# Patient Record
Sex: FEMALE | Race: WHITE | Employment: UNEMPLOYED | URBAN - METROPOLITAN AREA
[De-identification: names, ages, dates, MRNs, and addresses within clinical notes are randomized per-mention and may not be internally consistent; named-entity substitution may affect disease eponyms.]

---

## 2017-04-24 PROBLEM — S46.812A TRAPEZIUS STRAIN, LEFT, INITIAL ENCOUNTER: Status: ACTIVE | Noted: 2017-04-24

## 2017-04-24 PROBLEM — G25.89 SCAPULAR DYSKINESIS: Status: ACTIVE | Noted: 2017-04-24

## 2017-06-26 PROCEDURE — 88175 CYTOPATH C/V AUTO FLUID REDO: CPT | Performed by: OBSTETRICS & GYNECOLOGY

## 2017-06-26 PROCEDURE — 87624 HPV HI-RISK TYP POOLED RSLT: CPT | Performed by: OBSTETRICS & GYNECOLOGY

## 2017-07-20 ENCOUNTER — OFFICE VISIT (OUTPATIENT)
Dept: FAMILY MEDICINE CLINIC | Facility: CLINIC | Age: 47
End: 2017-07-20

## 2017-07-20 VITALS
TEMPERATURE: 98 F | SYSTOLIC BLOOD PRESSURE: 100 MMHG | WEIGHT: 127 LBS | DIASTOLIC BLOOD PRESSURE: 60 MMHG | HEART RATE: 93 BPM | HEIGHT: 66.25 IN | RESPIRATION RATE: 12 BRPM | BODY MASS INDEX: 20.41 KG/M2

## 2017-07-20 DIAGNOSIS — B07.0 PLANTAR WART: Primary | ICD-10-CM

## 2017-07-20 PROCEDURE — 99213 OFFICE O/P EST LOW 20 MIN: CPT | Performed by: FAMILY MEDICINE

## 2017-07-25 NOTE — PROGRESS NOTES
HPI:    Patient ID: Janett Baird is a 56year old female. HPI  Patient is here for evaluation of wart on the bottom of her foot. She would like it surgically removed.   Review of Systems  Negative except for the above       Current Outpatient Pre

## 2017-09-11 ENCOUNTER — OFFICE VISIT (OUTPATIENT)
Dept: FAMILY MEDICINE CLINIC | Facility: CLINIC | Age: 47
End: 2017-09-11

## 2017-09-11 VITALS
BODY MASS INDEX: 21.07 KG/M2 | SYSTOLIC BLOOD PRESSURE: 110 MMHG | DIASTOLIC BLOOD PRESSURE: 62 MMHG | TEMPERATURE: 98 F | HEIGHT: 65.5 IN | HEART RATE: 80 BPM | WEIGHT: 128 LBS | RESPIRATION RATE: 16 BRPM

## 2017-09-11 DIAGNOSIS — Z01.89 ROUTINE LAB DRAW: ICD-10-CM

## 2017-09-11 DIAGNOSIS — R74.8 ELEVATED LIVER ENZYMES: ICD-10-CM

## 2017-09-11 DIAGNOSIS — R73.09 ABNORMAL GLUCOSE: ICD-10-CM

## 2017-09-11 DIAGNOSIS — Z12.31 VISIT FOR SCREENING MAMMOGRAM: ICD-10-CM

## 2017-09-11 DIAGNOSIS — Z00.00 ROUTINE ADULT HEALTH MAINTENANCE: Primary | ICD-10-CM

## 2017-09-11 PROCEDURE — 99396 PREV VISIT EST AGE 40-64: CPT | Performed by: FAMILY MEDICINE

## 2017-09-11 NOTE — PROGRESS NOTES
HPI:   Andres Luna is a 56year old female who presents for a complete physical exam. Symptoms: denies discharge, itching, burning or dysuria. Patient has no complaints today.     Wt Readings from Last 6 Encounters:  09/11/17 : 128 lb  07/20/17 : 12 Collected:           06/26/2017 10:05 AM          Ordering Location:     [facility] [place] [facility] - 120          Received:            06/26/2017 09:09 PM                                WALT BAKER excessive sweating. LYMPHATICS: denies swollen glands      EXAM:   /62   Pulse 80   Temp 97.9 °F (36.6 °C) (Oral)   Resp 16   Ht 65.5\"   Wt 128 lb   BMI 20.98 kg/m²   Body mass index is 20.98 kg/m².    GENERAL: well developed, well nourished and in recommendations and agrees to the plan. The patient is asked to return for complete physical yearly.

## 2017-09-12 ENCOUNTER — HOSPITAL ENCOUNTER (OUTPATIENT)
Dept: MAMMOGRAPHY | Age: 47
Discharge: HOME | End: 2017-09-12
Attending: FAMILY MEDICINE
Payer: COMMERCIAL

## 2017-09-12 DIAGNOSIS — Z12.31 VISIT FOR SCREENING MAMMOGRAM: ICD-10-CM

## 2017-09-12 PROCEDURE — 77067 SCR MAMMO BI INCL CAD: CPT | Performed by: FAMILY MEDICINE

## 2017-09-22 PROBLEM — M54.12 CERVICAL RADICULAR PAIN: Status: ACTIVE | Noted: 2017-09-22

## 2017-09-29 ENCOUNTER — HOSPITAL ENCOUNTER (OUTPATIENT)
Dept: ULTRASOUND IMAGING | Age: 47
Discharge: HOME | End: 2017-09-29
Attending: FAMILY MEDICINE
Payer: COMMERCIAL

## 2017-09-29 ENCOUNTER — HOSPITAL ENCOUNTER (OUTPATIENT)
Dept: MAMMOGRAPHY | Age: 47
Discharge: HOME | End: 2017-09-29
Attending: FAMILY MEDICINE
Payer: COMMERCIAL

## 2017-09-29 DIAGNOSIS — R92.8 ABNORMAL MAMMOGRAM OF BOTH BREASTS: ICD-10-CM

## 2017-09-29 PROCEDURE — 77066 DX MAMMO INCL CAD BI: CPT | Performed by: FAMILY MEDICINE

## 2017-09-29 PROCEDURE — 76641 ULTRASOUND BREAST COMPLETE: CPT | Performed by: FAMILY MEDICINE

## 2017-09-29 PROCEDURE — 77062 BREAST TOMOSYNTHESIS BI: CPT | Performed by: FAMILY MEDICINE

## 2017-10-02 PROBLEM — M48.02 NEURAL FORAMINAL STENOSIS OF CERVICAL SPINE: Status: ACTIVE | Noted: 2017-10-02

## 2017-10-03 ENCOUNTER — TELEPHONE (OUTPATIENT)
Dept: FAMILY MEDICINE CLINIC | Facility: CLINIC | Age: 47
End: 2017-10-03

## 2017-10-03 NOTE — TELEPHONE ENCOUNTER
Pt would like to know the name of a derm that is aggressive with warts.  She stated Dr. Bertin Han gave her the name of one but she cannot remember and it is not in her chart

## 2017-10-05 PROBLEM — M50.30 DDD (DEGENERATIVE DISC DISEASE), CERVICAL: Status: ACTIVE | Noted: 2017-10-05

## 2017-10-05 PROBLEM — M47.812 OSTEOARTHRITIS OF CERVICAL SPINE, UNSPECIFIED SPINAL OSTEOARTHRITIS COMPLICATION STATUS: Status: ACTIVE | Noted: 2017-10-05

## 2017-11-29 PROBLEM — M47.812 FACET ARTHROPATHY, CERVICAL: Status: ACTIVE | Noted: 2017-11-29

## 2017-12-07 ENCOUNTER — OFFICE VISIT (OUTPATIENT)
Dept: FAMILY MEDICINE CLINIC | Facility: CLINIC | Age: 47
End: 2017-12-07

## 2017-12-07 VITALS
SYSTOLIC BLOOD PRESSURE: 140 MMHG | TEMPERATURE: 98 F | RESPIRATION RATE: 12 BRPM | DIASTOLIC BLOOD PRESSURE: 80 MMHG | BODY MASS INDEX: 22.22 KG/M2 | HEART RATE: 95 BPM | WEIGHT: 135 LBS | OXYGEN SATURATION: 100 % | HEIGHT: 65.5 IN

## 2017-12-07 DIAGNOSIS — R74.8 ELEVATED LIVER ENZYMES: ICD-10-CM

## 2017-12-07 DIAGNOSIS — F32.A DEPRESSION, UNSPECIFIED DEPRESSION TYPE: Primary | ICD-10-CM

## 2017-12-07 PROCEDURE — 99214 OFFICE O/P EST MOD 30 MIN: CPT | Performed by: FAMILY MEDICINE

## 2017-12-07 NOTE — PROGRESS NOTES
HPI:    Patient ID: Abiola Faria is a 56year old female. HPI  Patient is here with complaint of having depression over the last several months or more.   She states that her  is controlling and this has caused her a great deal of grief and like her to be seen by [facility] psychiatry department. Will make referral to [facility]. I also recommend patient have lab work done an abdominal ultrasound which she has not done from August 2016. Importance of doing this discussed.   Patient was rafat

## 2018-05-21 ENCOUNTER — OFFICE VISIT (OUTPATIENT)
Dept: FAMILY MEDICINE CLINIC | Facility: CLINIC | Age: 48
End: 2018-05-21

## 2018-05-21 VITALS
TEMPERATURE: 99 F | DIASTOLIC BLOOD PRESSURE: 78 MMHG | RESPIRATION RATE: 12 BRPM | OXYGEN SATURATION: 100 % | SYSTOLIC BLOOD PRESSURE: 130 MMHG | WEIGHT: 130 LBS | BODY MASS INDEX: 21.4 KG/M2 | HEIGHT: 65.5 IN | HEART RATE: 97 BPM

## 2018-05-21 DIAGNOSIS — L98.9 SKIN LESION: Primary | ICD-10-CM

## 2018-05-21 DIAGNOSIS — R74.8 ELEVATED LIVER ENZYMES: ICD-10-CM

## 2018-05-21 PROCEDURE — 99213 OFFICE O/P EST LOW 20 MIN: CPT | Performed by: FAMILY MEDICINE

## 2018-05-21 RX ORDER — CLOBETASOL PROPIONATE 0.5 MG/G
CREAM TOPICAL
Qty: 30 G | Refills: 0 | Status: ON HOLD | OUTPATIENT
Start: 2018-05-21 | End: 2018-05-26

## 2018-05-21 RX ORDER — CLINDAMYCIN HYDROCHLORIDE 300 MG/1
300 CAPSULE ORAL 2 TIMES DAILY
Qty: 20 CAPSULE | Refills: 0 | Status: ON HOLD | OUTPATIENT
Start: 2018-05-21 | End: 2018-05-26

## 2018-05-21 NOTE — PROGRESS NOTES
HPI:    Patient ID: Adonis Morrell is a 56year old female. HPI  Patient is here with complaint of waking up this morning with having possible spider bite on the right hand on the top of her middle finger. She has a red swollen area there.   No shnaiqua follow-up in 1 week. If worsens seek medical attention through the ER. Patient told of importance of getting fasting blood work done as she does have history of elevated liver enzymes as well.   Patient told need to have checked done for that with blood w

## 2018-05-22 ENCOUNTER — TELEPHONE (OUTPATIENT)
Dept: FAMILY MEDICINE CLINIC | Facility: CLINIC | Age: 48
End: 2018-05-22

## 2018-05-22 ENCOUNTER — HOSPITAL ENCOUNTER (INPATIENT)
Facility: HOSPITAL | Age: 48
LOS: 3 days | Discharge: HOME | DRG: 603 | End: 2018-05-26
Attending: EMERGENCY MEDICINE | Admitting: HOSPITALIST
Payer: COMMERCIAL

## 2018-05-22 DIAGNOSIS — L03.113 CELLULITIS OF RIGHT HAND: Primary | ICD-10-CM

## 2018-05-22 PROCEDURE — 02HV33Z INSERTION OF INFUSION DEVICE INTO SUPERIOR VENA CAVA, PERCUTANEOUS APPROACH: ICD-10-PCS | Performed by: HOSPITALIST

## 2018-05-22 PROCEDURE — 99220 INITIAL OBSERVATION CARE,LEVL III: CPT | Performed by: HOSPITALIST

## 2018-05-22 RX ORDER — POTASSIUM CHLORIDE 20 MEQ/1
40 TABLET, EXTENDED RELEASE ORAL EVERY 4 HOURS
Status: COMPLETED | OUTPATIENT
Start: 2018-05-23 | End: 2018-05-23

## 2018-05-22 RX ORDER — HYDROMORPHONE HYDROCHLORIDE 1 MG/ML
0.5 INJECTION, SOLUTION INTRAMUSCULAR; INTRAVENOUS; SUBCUTANEOUS ONCE
Status: COMPLETED | OUTPATIENT
Start: 2018-05-22 | End: 2018-05-22

## 2018-05-22 RX ORDER — HYDROMORPHONE HYDROCHLORIDE 1 MG/ML
0.8 INJECTION, SOLUTION INTRAMUSCULAR; INTRAVENOUS; SUBCUTANEOUS EVERY 2 HOUR PRN
Status: DISCONTINUED | OUTPATIENT
Start: 2018-05-22 | End: 2018-05-26

## 2018-05-22 RX ORDER — HYDROCODONE BITARTRATE AND ACETAMINOPHEN 5; 325 MG/1; MG/1
1 TABLET ORAL EVERY 4 HOURS PRN
Status: DISCONTINUED | OUTPATIENT
Start: 2018-05-22 | End: 2018-05-26

## 2018-05-22 RX ORDER — ONDANSETRON 2 MG/ML
4 INJECTION INTRAMUSCULAR; INTRAVENOUS EVERY 6 HOURS PRN
Status: DISCONTINUED | OUTPATIENT
Start: 2018-05-22 | End: 2018-05-26

## 2018-05-22 RX ORDER — ONDANSETRON 2 MG/ML
4 INJECTION INTRAMUSCULAR; INTRAVENOUS ONCE
Status: COMPLETED | OUTPATIENT
Start: 2018-05-22 | End: 2018-05-22

## 2018-05-22 RX ORDER — METOCLOPRAMIDE HYDROCHLORIDE 5 MG/ML
10 INJECTION INTRAMUSCULAR; INTRAVENOUS EVERY 8 HOURS PRN
Status: DISCONTINUED | OUTPATIENT
Start: 2018-05-22 | End: 2018-05-26

## 2018-05-22 RX ORDER — CLINDAMYCIN PHOSPHATE 600 MG/50ML
600 INJECTION INTRAVENOUS ONCE
Status: COMPLETED | OUTPATIENT
Start: 2018-05-22 | End: 2018-05-22

## 2018-05-22 RX ORDER — HYDROMORPHONE HYDROCHLORIDE 1 MG/ML
0.2 INJECTION, SOLUTION INTRAMUSCULAR; INTRAVENOUS; SUBCUTANEOUS EVERY 2 HOUR PRN
Status: DISCONTINUED | OUTPATIENT
Start: 2018-05-22 | End: 2018-05-26

## 2018-05-22 RX ORDER — CLINDAMYCIN PHOSPHATE 600 MG/50ML
600 INJECTION INTRAVENOUS EVERY 8 HOURS
Status: DISCONTINUED | OUTPATIENT
Start: 2018-05-23 | End: 2018-05-23

## 2018-05-22 RX ORDER — SODIUM CHLORIDE 9 MG/ML
INJECTION, SOLUTION INTRAVENOUS CONTINUOUS
Status: DISCONTINUED | OUTPATIENT
Start: 2018-05-22 | End: 2018-05-25

## 2018-05-22 RX ORDER — KETOROLAC TROMETHAMINE 30 MG/ML
30 INJECTION, SOLUTION INTRAMUSCULAR; INTRAVENOUS ONCE
Status: COMPLETED | OUTPATIENT
Start: 2018-05-22 | End: 2018-05-22

## 2018-05-22 RX ORDER — HYDROMORPHONE HYDROCHLORIDE 1 MG/ML
0.4 INJECTION, SOLUTION INTRAMUSCULAR; INTRAVENOUS; SUBCUTANEOUS EVERY 2 HOUR PRN
Status: DISCONTINUED | OUTPATIENT
Start: 2018-05-22 | End: 2018-05-26

## 2018-05-22 RX ORDER — ACETAMINOPHEN 325 MG/1
650 TABLET ORAL EVERY 4 HOURS PRN
Status: DISCONTINUED | OUTPATIENT
Start: 2018-05-22 | End: 2018-05-26

## 2018-05-22 RX ORDER — HYDROCODONE BITARTRATE AND ACETAMINOPHEN 5; 325 MG/1; MG/1
2 TABLET ORAL EVERY 4 HOURS PRN
Status: DISCONTINUED | OUTPATIENT
Start: 2018-05-22 | End: 2018-05-26

## 2018-05-22 NOTE — TELEPHONE ENCOUNTER
See recommendations below. Spoke with patient regarding Md recommendations, patient verbalized understanding and states that she will go to the ER and will contact us with an update.

## 2018-05-22 NOTE — TELEPHONE ENCOUNTER
I would strongly recommend getting it checked out through the ER or urgent care. She may need further testing done.

## 2018-05-22 NOTE — TELEPHONE ENCOUNTER
Patient was seen yesterday for a spider bite. Patient would like to know if there is anything she can take for the swelling and for the pain. She states that her fingers are still very swollen and she is unable to bend them.  The pain is now going up to her

## 2018-05-22 NOTE — TELEPHONE ENCOUNTER
Patient states that right 3 middle fingers and hand are worse than yesterday, pt states that she cant move fingers or make a fist. States that through out the night pain radiates up her arm up to her shoulder. Numb feeling.  PT has started antibiotic and Cl

## 2018-05-22 NOTE — TELEPHONE ENCOUNTER
Attempted to reach pt for a condition update as Dr Prisca Wiseman would like for her to go to the ER to make sure she does not have cellulitis that would require IV abx.  No answer, left message to call back

## 2018-05-23 ENCOUNTER — APPOINTMENT (OUTPATIENT)
Dept: GENERAL RADIOLOGY | Facility: HOSPITAL | Age: 48
DRG: 603 | End: 2018-05-23
Attending: HOSPITALIST
Payer: COMMERCIAL

## 2018-05-23 PROCEDURE — 99232 SBSQ HOSP IP/OBS MODERATE 35: CPT | Performed by: HOSPITALIST

## 2018-05-23 PROCEDURE — 0H9FXZZ DRAINAGE OF RIGHT HAND SKIN, EXTERNAL APPROACH: ICD-10-PCS | Performed by: ORTHOPAEDIC SURGERY

## 2018-05-23 PROCEDURE — 73130 X-RAY EXAM OF HAND: CPT | Performed by: HOSPITALIST

## 2018-05-23 RX ORDER — LORAZEPAM 2 MG/ML
0.5 INJECTION INTRAMUSCULAR ONCE
Status: COMPLETED | OUTPATIENT
Start: 2018-05-23 | End: 2018-05-23

## 2018-05-23 RX ORDER — LORAZEPAM 2 MG/ML
INJECTION INTRAMUSCULAR
Status: COMPLETED
Start: 2018-05-23 | End: 2018-05-23

## 2018-05-23 RX ORDER — DIPHENHYDRAMINE HCL 25 MG
25 CAPSULE ORAL EVERY 6 HOURS PRN
Status: DISCONTINUED | OUTPATIENT
Start: 2018-05-23 | End: 2018-05-26

## 2018-05-23 NOTE — CONSULTS
INFECTIOUS DISEASE CONSULT NOTE    Chai Barnett Patient Status:  Inpatient    1970 MRN [id number]   [facility] 4NW-A Attending Dread Contreras MD   Hosp Day # 0 PCP Mita Thomason per tab 2 tablet, 2 tablet, Oral, Q4H PRN  •  HYDROmorphone HCl (DILAUDID) 1 MG/ML injection 0.2 mg, 0.2 mg, Intravenous, Q2H PRN **OR** HYDROmorphone HCl (DILAUDID) 1 MG/ML injection 0.4 mg, 0.4 mg, Intravenous, Q2H PRN **OR** HYDROmorphone HCl (DILAUDID) --    TP  7.8   --      Microbiology    Reviewed in EMR,   [address]    Radiology: No results found. ASSESSMENT/ PLAN:    1. Skin/ soft tissue infection R hand- possible MRSA infection.  Pt with hand swelling and difficulty moving fingers, need to r/o d

## 2018-05-23 NOTE — ED INITIAL ASSESSMENT (HPI)
Pt states since Monday AM she is having redness to the right 3rd finger that she noticed when she woke up. Today the swelling increased and there is redness moving up her arm. She was started on Clindamycin by her PCP yesterday.

## 2018-05-23 NOTE — PAYOR COMM NOTE
--------------  ADMISSION REVIEW     Payor: [address]  Subscriber #:  [id number]  Authorization Number: [id number]/A    Admit date: 5/23/18  Admit time: 200       Admitting Physician: Leonila Estevez MD  Attending Physician:  Dolores Jordan MD  Primary LIGATION    Review of Systems  Positive for stated complaint: Bite right hand/ redness/swelling   Other systems are as noted in HPI. Constitutional and vital signs reviewed. All other systems reviewed and negative except as noted above.     Physical Exa Neutrophil Absolute Prelim 7.54 (*)     Neutrophil Absolute 7.54 (*)     Lymphocyte Absolute 0.75 (*)     All other components within normal limits   CBC WITH DIFFERENTIAL WITH PLATELET   BLOOD CULTURE   BLOOD CULTURE     ED Course as of May 22 2059  MDM her right third finger. The redness, swelling and pain progressively worsened as the day progressed. The swelling and redness involved the dorsum of the hand and she noticed red streaks going up her arm.   She also started to c/o pain in her forearm and s murmurs, rubs or gallops. Equal pulses. Chest and Back: No tenderness or deformity. Abdomen: Soft, nontender, nondistended. Positive bowel sounds. No rebound, guarding or organomegaly. Neurologic: No focal neurological deficits.  CNII-XII grossly intac HYDROcodone-acetaminophen (NORCO) 5-325 MG per tab 1 tablet     Date Action Dose Route User    5/23/2018 1120 Given 1 tablet Oral Kylah Maria [facility]    5/23/2018 0010 Given 1 tablet Oral Kevin Handy, RN      HYDROmorphone HCl (DILAUDID) 1 MG/

## 2018-05-23 NOTE — H&P
[facility]PITAL[facility]  History and Physical     Hodan Carroll Patient Status:  Observation    1970 MRN [id number]   [facility] 4NW-A Attending Daniel Monaco MD   Hosp Day # 0 PCP Gerhardt Guile, MD     Chief Complaint: Celluitis MG Oral Cap Take 1 capsule (300 mg total) by mouth 2 (two) times daily. Disp: 20 capsule Rfl: 0   Clobetasol Propionate 0.05 % External Cream Apply a thin layer twice a day upto 2 weeks.  Disp: 30 g Rfl: 0   Betamethasone Dipropionate 0.05 % External Ointme 05/22/18 2013   GLU  94   BUN  4*   CREATSERUM  0.68   GFRAA  121   GFRNAA  105   CA  9.2   ALB  4.1   NA  139   K  3.4*   CL  104   CO2  27.0   ALKPHO  62   AST  36   ALT  56*   BILT  0.5   TP  7.8       Estimated Creatinine Clearance: 96.3 mL/min (base

## 2018-05-23 NOTE — PROGRESS NOTES
NURSING ADMISSION NOTE      Patient admitted via  Wheelchair, accompanied by family member via   Family car from [place] ER, right hand is swollen and tender, some  Redness, initial dose of IV ABT given in PER, moderate pain.   Right hand elevated, O

## 2018-05-23 NOTE — ED PROVIDER NOTES
Patient Seen in: THE [facility] Emergency Department In [place]    History   Patient presents with:  Bite Sting,Insect (integumentary)  Cellulitis (integumentary, infectious)    Stated Complaint: Bite right hand/ redness/swelling     HPI    Patient is a 47-ye 20   Ht 167.6 cm (5' 6\")   Wt 59 kg   SpO2 99%   BMI 20.98 kg/m²         Physical Exam   Constitutional: She is oriented to person, place, and time. She appears well-developed and well-nourished. HENT:   Head: Normocephalic and atraumatic.    Mouth/Throa DIFFERENTIAL[[id number]]          Abnormal            Final result                 Please view results for these tests on the individual orders.    BLOOD CULTURE   BLOOD CULTURE       ED Course as of May 22 2059  ---------------------------------------------

## 2018-05-23 NOTE — PLAN OF CARE
Minimal or absence of nausea and vomiting Not Progressing      Skin integrity remains intact Not Progressing      Absence of urinary retention Progressing      Electrolytes maintained within normal limits Progressing    Afebrile, still with erythema to rig

## 2018-05-23 NOTE — CONSULTS
[facility] dosing service    Initial Pharmacokinetic Consult for Vancomycin Dosing     Corrie Foreman is a 56year old female who is being treated for cellulitis.   Pharmacy has been asked to dose Vancomycin by Dr. Gurpreet Ridley    She is allergic to cephale assist in her care.     Leah Vazquez, PharmD  5/23/2018  12:46 PM  [address] [place] [address] Extension: [phone]

## 2018-05-23 NOTE — PROGRESS NOTES
[facility]  Progress Note     Myranda Chapa Patient Status:  Inpatient    1970 MRN [id number]   [facility] 4NW-A Attending Anabella Marcus MD   Hosp Day # 0 PCP Anita Cochran MD     Chief Complaint: cellulitis    S: Joslyn cultures  3. ID consult  2. Hypokalemia, replace  3.  Cervical radiculopathy       Plan of care: as above    Quality:  · DVT Prophylaxis: SCDS  · CODE status: full  · Shirley:no  · Central line: no    Estimated date of discharge: TBD  Discharge is dependent o

## 2018-05-24 PROCEDURE — 99232 SBSQ HOSP IP/OBS MODERATE 35: CPT | Performed by: HOSPITALIST

## 2018-05-24 NOTE — PLAN OF CARE
SKIN/TISSUE INTEGRITY - ADULT    • Skin integrity remains intact Not Progressing          GASTROINTESTINAL - ADULT    • Minimal or absence of nausea and vomiting Progressing        GENITOURINARY - ADULT    • Absence of urinary retention Progressing

## 2018-05-24 NOTE — PROGRESS NOTES
Pain decreased since yesterday. Patient states she is better able to move the fingers. Afebrile    Exam of the right upper extremity: Discoloration persists at the proximal dorsal long finger. Small open area from the I&D.   No significant ongoing shaniqua

## 2018-05-24 NOTE — PAYOR COMM NOTE
--------------  CONTINUED STAY REVIEW    Payor: [facility]  Subscriber #:  [id number]  Authorization Number: [id number]    Admit date: 5/23/18  Admit time: 200    Admitting Physician: Anabella Marcus MD  Attending Physician:  Anabella Marcus than 1 mL of purulent material was expressed from the site. Sterile gauze and dressing was applied with a surrounding ace wrap.  The procedure was performed under sterile conditions.             MEDICATIONS ADMINISTERED IN LAST 1 DAY:  diphenhydrAMINE (HOLLIE

## 2018-05-24 NOTE — PROGRESS NOTES
[facility]  TEL: [phone]  FAX: [phone]    David Razo Patient Status:  Inpatient    1970 MRN [id number]   [facility] 4NW-A Attending Antwan Francis MD   Hosp Day # 1 PCP Padma Plascencia COMPARISON:  None. INDICATIONS:  abscess? PATIENT STATED HISTORY: (As transcribed by Technologist)  Patient offered no additional history at this time.     FINDINGS:  There is soft tissue swelling within the right hand which may represent cellulitis in th

## 2018-05-24 NOTE — CONSULTS
[facility]  ORTHOPEDIC SURGERY CONSULT NOTE    Dez Keearmando Patient Status:  Inpatient    1970 MRN [id number]   [facility] 4NW-A Attending Augusta Schuster MD   Hosp Day # 0 PCP Columbus Rubinstein, MD     Reason for Consultation: Continuous  •  acetaminophen (TYLENOL) tab 650 mg, 650 mg, Oral, Q4H PRN **OR** HYDROcodone-acetaminophen (NORCO) 5-325 MG per tab 1 tablet, 1 tablet, Oral, Q4H PRN **OR** HYDROcodone-acetaminophen (NORCO) 5-325 MG per tab 2 tablet, 2 tablet, Oral, Q4H PRN right hand  - Continue antibiotics per ID  - Incision and drainage performed; aerobic cultures sent  - We will observe the patient over the next 24 hours to determine if any surgical intervention is necessary  - NPO at midnight    The risks and benefits of

## 2018-05-24 NOTE — PROGRESS NOTES
[facility]  Progress Note     Primo Mayer Patient Status:  Inpatient    1970 MRN [id number]   [facility] 4NW-A Attending Jayda Vides MD   Hosp Day # 1 PCP Binta Rodriguez MD     Chief Complaint: cellulitis    S: Joslyn and Ortho consult  2. Hypokalemia, replace  3.  Cervical radiculopathy       Plan of care: as above    Quality:  · DVT Prophylaxis: SCDS  · CODE status: full  · Shirley:no  · Central line: no    Estimated date of discharge: TBD  Discharge is dependent on: pro

## 2018-05-25 PROCEDURE — 99231 SBSQ HOSP IP/OBS SF/LOW 25: CPT | Performed by: HOSPITALIST

## 2018-05-25 RX ORDER — IBUPROFEN 400 MG/1
400 TABLET ORAL EVERY 6 HOURS PRN
Status: DISCONTINUED | OUTPATIENT
Start: 2018-05-25 | End: 2018-05-26

## 2018-05-25 NOTE — PLAN OF CARE
Pt is alert and oriented x 4. Vitals stable. C/o slight discomfort on the finger, motrin given. Redness and swelling on the hand and middle Rt finger improving. Pt is able to do some Range of motion on her Rt 3 rd finger. Dressing changed.  Encouraged the p

## 2018-05-25 NOTE — PROGRESS NOTES
[address] [place] [address]  TEL: [phone]  FAX: [phone]    Senthil Needle Patient Status:  Inpatient    1970 MRN [id number]   [facility] 4NW-A Attending Ankit Queen MD   Hosp Day # 2 PCP Renu Briseno reviewed. No new imaging             ASSESSMENT/ PLAN:     1. Skin/ soft tissue infection R hand- possible MRSA infection but final cx pend  -cont IV vanco  -appreciate ortho eval. Cont to monitor area.  Seems much better already, agree that at this point d

## 2018-05-25 NOTE — PROGRESS NOTES
[facility] dosing service    Follow-up Pharmacokinetic Consult for Vancomycin Dosing     Myranda Chapa is a 56year old female admitted on 5/22/18 who is being treated for right hand infection, possible MRSA/rule out tenosynovitis per Dr. Elmer Wynn n ug/mL.    3.  Pharmacy will need BUN/Scr daily while on Vancomycin to assess renal function. 4.  Pharmacy will follow and monitor renal function changes, toxicity and efficacy.     Susana Salgado, PharmD  5/25/2018  1:32 AM  [facility] Extension:

## 2018-05-25 NOTE — PROGRESS NOTES
[facility]  Progress Note     Samir Mazariegos Patient Status:  Inpatient    1970 MRN [id number]   [facility] 4NW-A Attending Sonia Damico MD   Hosp Day # 2 PCP Charu Jones MD     Chief Complaint: cellulitis    S: Joslyn D  3. F/u cultures- ID showing staph aureus- await sensitvites  4. ID and Ortho consult  2. Hypokalemia, replaced  3.  Cervical radiculopathy       Plan of care: as above    Quality:  · DVT Prophylaxis: SCDS  · CODE status: full  · Shirley:no  · Central line:

## 2018-05-25 NOTE — CM/SW NOTE
SW received a call from MD Krishna's office. Pt is approved for teach and train, IV antibiotics w/HH, and OP infusion. Per MD Lombardi's note pt will dc w/PO antibiotics.  SW to confirm w/ID

## 2018-05-25 NOTE — PROGRESS NOTES
Patient tearful this shift,Complained of itching under right hand ace wrap. Wrap removed  hand cleaned with alcohol,bite cleaned with normal saline and covered with non adhesive pad. Site covered with kerlix. Reports hand feels better. Dilaudid 0.2 IV given x1

## 2018-05-25 NOTE — PROGRESS NOTES
Patient doing better this morning. Decrease pain, she states that she is better able to move the fingers. Exam of the right upper extremity: Swelling and erythema significantly improved.   Decreased tenderness even at the distal metacarpal/MCP joint reg

## 2018-05-25 NOTE — PLAN OF CARE
Problem: Patient/Family Goals  Goal: Patient/Family Short Term Goal  Patient's Short Term Goal: get this swelling of my hand down  Interventions:   - elevate right hand  -antibiotic as ordered  -pain meds as needed  Monitor labs and symptoms.  - See additi

## 2018-05-26 VITALS
RESPIRATION RATE: 16 BRPM | WEIGHT: 130 LBS | HEART RATE: 76 BPM | BODY MASS INDEX: 20.89 KG/M2 | TEMPERATURE: 98 F | DIASTOLIC BLOOD PRESSURE: 79 MMHG | SYSTOLIC BLOOD PRESSURE: 132 MMHG | HEIGHT: 66 IN | OXYGEN SATURATION: 98 %

## 2018-05-26 PROCEDURE — 99238 HOSP IP/OBS DSCHRG MGMT 30/<: CPT | Performed by: HOSPITALIST

## 2018-05-26 RX ORDER — CLINDAMYCIN HYDROCHLORIDE 300 MG/1
300 CAPSULE ORAL 4 TIMES DAILY
Qty: 40 CAPSULE | Refills: 0 | Status: SHIPPED | OUTPATIENT
Start: 2018-05-26 | End: 2018-06-05

## 2018-05-26 NOTE — PROGRESS NOTES
[facility]                INFECTIOUS DISEASE PROGRESS NOTE    Andres Luna Patient Status:  Inpatient    1970 MRN [id number]   [facility] 4NW-A Attending Tiana Gambino MD   Hosp Day # 3 PCP Levi Corrigan MD     Anti Blood Culture FREQ X 2 [[id number]] Collected: 05/22/18 2013   Order Status: Completed Lab Status: Preliminary result Updated: 05/26/18 0100   Specimen: Blood from Blood,peripheral     Blood Culture Result No Growth 3 Days   Aerobic Bacterial Culture Once [

## 2018-05-26 NOTE — PROGRESS NOTES
NURSING DISCHARGE NOTE    Discharged Home via Wheelchair.   Accompanied by Support staff  Belongings Taken by patient/family      Discharged patient in fair condition,Saline lock removed,Dc instruction and  priscription given,Instructed on follow up red

## 2018-05-26 NOTE — PLAN OF CARE
Patient/Family Goals    • Patient/Family Long Term Goal Adequate for Discharge    • Patient/Family Short Term Goal Adequate for Discharge        SKIN/TISSUE INTEGRITY - ADULT    • Skin integrity remains intact Adequate for Discharge          Renetta Ramirez

## 2018-05-26 NOTE — PROGRESS NOTES
[facility] dosing service    Follow-up Pharmacokinetic Consult for Vancomycin Dosing     Warren Mejia is a 56year old female admitted on 5/22/18 who is being treated for Skin/ soft tissue infection R hand- possible MRSA infection.  Patient is on d pharmacokinetics, weight and renal function). 2.  Pharmacy will re-check Vancomycin trough level in 5 days. Goal trough level 10-15 ug/mL. 3.  Pharmacy will need BUN/Scr daily while on Vancomycin to assess renal function.     4.  Pharmacy will follow

## 2018-05-27 NOTE — DISCHARGE SUMMARY
[facility]ITAL[facility]  DISCHARGE SUMMARY     Senthil Gupta Patient Status:  Inpatient    1970 MRN [id number]   [facility] 4NW-A Attending No att. providers found   Hosp Day # 3 PCP Walker Freeman MD     Date of Admission: 2018 methicillin sensitive staph aureus. Patient significantly improved and was eventually stable for discharge and a course of oral antibiotics.       Consultants:  • ID and ortho    Discharge Medication List:     Discharge Medications      CHANGE how you take

## 2018-05-29 ENCOUNTER — OFFICE VISIT (OUTPATIENT)
Dept: FAMILY MEDICINE CLINIC | Facility: CLINIC | Age: 48
End: 2018-05-29

## 2018-05-29 VITALS
WEIGHT: 130 LBS | SYSTOLIC BLOOD PRESSURE: 120 MMHG | OXYGEN SATURATION: 99 % | HEART RATE: 94 BPM | BODY MASS INDEX: 21.4 KG/M2 | RESPIRATION RATE: 12 BRPM | TEMPERATURE: 98 F | HEIGHT: 65.5 IN | DIASTOLIC BLOOD PRESSURE: 68 MMHG

## 2018-05-29 DIAGNOSIS — K21.9 GASTROESOPHAGEAL REFLUX DISEASE, ESOPHAGITIS PRESENCE NOT SPECIFIED: ICD-10-CM

## 2018-05-29 DIAGNOSIS — L03.113 CELLULITIS OF HAND, RIGHT: Primary | ICD-10-CM

## 2018-05-29 DIAGNOSIS — K14.8 TONGUE DISCOLORATION: ICD-10-CM

## 2018-05-29 DIAGNOSIS — L30.9 DERMATITIS: ICD-10-CM

## 2018-05-29 PROCEDURE — 1111F DSCHRG MED/CURRENT MED MERGE: CPT | Performed by: FAMILY MEDICINE

## 2018-05-29 PROCEDURE — 99214 OFFICE O/P EST MOD 30 MIN: CPT | Performed by: FAMILY MEDICINE

## 2018-05-29 NOTE — PROGRESS NOTES
HPI:    Patient ID: Marcelo Gonzalez is a 56year old female. HPI  4/27 in [facility], had spider bite left orbit. Then 5/30/18 she had done mulching outdoors anymore gloves and developed very scaly dry skin on fingers.   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   Tongue appears without any discoloration normal color no oral lesions noted recommend she try AmLactin 12% cream to the fingers to see if that helps with the skin dryness. I would recommend that she get some lab work done however to check for autoimmune diseases which can relate to some of her symptoms.   Patient agrees with the a

## 2018-06-26 ENCOUNTER — TELEPHONE (OUTPATIENT)
Dept: FAMILY MEDICINE CLINIC | Facility: CLINIC | Age: 48
End: 2018-06-26

## 2018-06-26 NOTE — TELEPHONE ENCOUNTER
Fax received from [facility] regarding medical record request.  Left message for Juliet Mejia RN,  to return call for further information. Fax request is not specific to what dates of services are in question.   Fax form in [facility] bin

## 2018-06-28 NOTE — TELEPHONE ENCOUNTER
Agus with [employer] called back to let you know she spoke with pt and she is doing well and her hand is fine. All cleared up.     Any questions please call [phone] Ext [phone]

## 2018-07-05 ENCOUNTER — TELEPHONE (OUTPATIENT)
Dept: FAMILY MEDICINE CLINIC | Facility: CLINIC | Age: 48
End: 2018-07-05

## 2018-07-05 NOTE — TELEPHONE ENCOUNTER
Called patient to discuss further. Patient states that she has a water blister touching other finger creating similar look to finger with blister. Patient has a hx of staph infection in this finger. Patient advised to be see at office visit.  She will keep

## 2018-07-05 NOTE — TELEPHONE ENCOUNTER
Pt left vm over the holiday stating she another issue with the cut on her finger where she has the staph infection

## 2018-07-31 PROBLEM — M54.12 CERVICAL RADICULOPATHY: Status: ACTIVE | Noted: 2018-07-31

## 2018-09-17 ENCOUNTER — OFFICE VISIT (OUTPATIENT)
Dept: FAMILY MEDICINE CLINIC | Facility: CLINIC | Age: 48
End: 2018-09-17
Payer: COMMERCIAL

## 2018-09-17 VITALS
HEART RATE: 104 BPM | TEMPERATURE: 98 F | BODY MASS INDEX: 21.38 KG/M2 | OXYGEN SATURATION: 100 % | HEIGHT: 66 IN | WEIGHT: 133 LBS | RESPIRATION RATE: 12 BRPM | DIASTOLIC BLOOD PRESSURE: 60 MMHG | SYSTOLIC BLOOD PRESSURE: 120 MMHG

## 2018-09-17 DIAGNOSIS — Z00.00 ROUTINE ADULT HEALTH MAINTENANCE: Primary | ICD-10-CM

## 2018-09-17 PROCEDURE — 90471 IMMUNIZATION ADMIN: CPT | Performed by: FAMILY MEDICINE

## 2018-09-17 PROCEDURE — 99396 PREV VISIT EST AGE 40-64: CPT | Performed by: FAMILY MEDICINE

## 2018-09-17 PROCEDURE — 90686 IIV4 VACC NO PRSV 0.5 ML IM: CPT | Performed by: FAMILY MEDICINE

## 2018-09-18 NOTE — PROGRESS NOTES
HPI:   Chichi Sneed is a 53year old female who presents for a complete physical exam. Symptoms: denies discharge, itching, burning or dysuria.   Patient complains that after she had a spider bite and reaction and infection in May 2018 was treated for <1.00 mg/dL   VANCOMYCIN TROUGH, SERUM   Result Value Ref Range    Vancomycin Trough 6.8 (L) 10.0 - 20.0 ug/mL   BASIC METABOLIC PANEL (8)   Result Value Ref Range    Glucose 89 70 - 99 mg/dL    BUN 5 (L) 8 - 20 mg/dL    Creatinine 0.42 (L) 0.55 - 1.02 mg/ uL    Immature Granulocyte Absolute 0.03 0.00 - 1.00 x10(3) uL    Neutrophil % 83.4 %    Lymphocyte % 8.3 %    Monocyte % 6.6 %    Eosinophil % 1.0 %    Basophil % 0.4 %    Immature Granulocyte % 0.3 %          Current Outpatient Medications:  Multiple Vit denies leg cramps  GI: denies abdominal pain, bowel movement changes, blood in stool  : denies urinary problems, vaginal discharge or discomfort  MUSCULOSKELETAL: denies joint pain or stiffness  NEURO: denies headaches, tingling or dizziness  PSYCHE: den age 28-36, patient was told to have a heart scan done for coronary artery disease screening every 3-5 years. Information given from [facility] so patient can schedule it.      Above age 48 or age 36 if family history of colon cancer, patient instructed to get c primary care doctors if needed. I informed patient that she had blood work in the system ordered from her GI doctor from June 8, 2018 and she shakes her head surprisingly and states that she did not know that.   I also told her that they requested that

## 2018-10-03 ENCOUNTER — HOSPITAL ENCOUNTER (OUTPATIENT)
Dept: CARDIOLOGY CLINIC | Facility: HOSPITAL | Age: 48
Discharge: HOME | End: 2018-10-03
Attending: FAMILY MEDICINE

## 2018-10-03 DIAGNOSIS — Z13.6 SCREENING FOR HEART DISEASE: ICD-10-CM

## 2018-11-01 ENCOUNTER — MED REC SCAN ONLY (OUTPATIENT)
Dept: FAMILY MEDICINE CLINIC | Facility: CLINIC | Age: 48
End: 2018-11-01

## 2018-11-08 ENCOUNTER — OFFICE VISIT (OUTPATIENT)
Dept: FAMILY MEDICINE CLINIC | Facility: CLINIC | Age: 48
End: 2018-11-08
Payer: COMMERCIAL

## 2018-11-08 ENCOUNTER — APPOINTMENT (OUTPATIENT)
Dept: LAB | Age: 48
End: 2018-11-08
Attending: FAMILY MEDICINE
Payer: COMMERCIAL

## 2018-11-08 VITALS
WEIGHT: 130 LBS | OXYGEN SATURATION: 100 % | SYSTOLIC BLOOD PRESSURE: 150 MMHG | DIASTOLIC BLOOD PRESSURE: 90 MMHG | TEMPERATURE: 99 F | HEART RATE: 101 BPM | RESPIRATION RATE: 12 BRPM | BODY MASS INDEX: 20.89 KG/M2 | HEIGHT: 66 IN

## 2018-11-08 DIAGNOSIS — Z01.818 PREOP GENERAL PHYSICAL EXAM: ICD-10-CM

## 2018-11-08 DIAGNOSIS — S43.432D LABRAL TEAR OF SHOULDER, LEFT, SUBSEQUENT ENCOUNTER: ICD-10-CM

## 2018-11-08 DIAGNOSIS — Z01.818 PREOP GENERAL PHYSICAL EXAM: Primary | ICD-10-CM

## 2018-11-08 PROCEDURE — 80053 COMPREHEN METABOLIC PANEL: CPT | Performed by: FAMILY MEDICINE

## 2018-11-08 PROCEDURE — 36415 COLL VENOUS BLD VENIPUNCTURE: CPT | Performed by: FAMILY MEDICINE

## 2018-11-08 PROCEDURE — 85027 COMPLETE CBC AUTOMATED: CPT | Performed by: FAMILY MEDICINE

## 2018-11-08 PROCEDURE — 99214 OFFICE O/P EST MOD 30 MIN: CPT | Performed by: FAMILY MEDICINE

## 2018-11-08 NOTE — PROGRESS NOTES
Judy Duron is a 53year old female who presents for a pre-operative physical exam.   Judy Duron is scheduled for a left shoulder arthroscopy procedure at [facility] on 11/19/18 performed by Dr Cristina Shaw.  Indication: labral tear    HPI related to morris completed. Pertinent positives and negatives noted in the the HPI.       PHYSICAL EXAM:   /88   Pulse 101   Temp 98.8 °F (37.1 °C) (Oral)   Resp 12   Ht 66\"   Wt 130 lb   LMP 10/22/2015   SpO2 100%   BMI 20.98 kg/m²    General:  Awake, alert, in no

## 2018-11-10 ENCOUNTER — TELEPHONE (OUTPATIENT)
Dept: FAMILY MEDICINE CLINIC | Facility: CLINIC | Age: 48
End: 2018-11-10

## 2018-11-10 NOTE — TELEPHONE ENCOUNTER
Please send my preop note. Patient is medically cleared for surgery. Preop request form in outbasket bin.

## 2018-11-13 RX ORDER — CYCLOBENZAPRINE HCL 10 MG
10 TABLET ORAL 3 TIMES DAILY PRN
COMMUNITY
End: 2019-03-01 | Stop reason: ALTCHOICE

## 2018-11-19 ENCOUNTER — HOSPITAL ENCOUNTER (OUTPATIENT)
Facility: HOSPITAL | Age: 48
Setting detail: HOSPITAL OUTPATIENT SURGERY
Discharge: HOME | End: 2018-11-19
Attending: ORTHOPAEDIC SURGERY | Admitting: ORTHOPAEDIC SURGERY
Payer: COMMERCIAL

## 2018-11-19 VITALS
DIASTOLIC BLOOD PRESSURE: 72 MMHG | HEIGHT: 66 IN | BODY MASS INDEX: 20.2 KG/M2 | RESPIRATION RATE: 16 BRPM | TEMPERATURE: 99 F | HEART RATE: 91 BPM | WEIGHT: 125.69 LBS | SYSTOLIC BLOOD PRESSURE: 110 MMHG | OXYGEN SATURATION: 97 %

## 2018-11-19 DIAGNOSIS — S43.432D SUPERIOR GLENOID LABRUM LESION OF LEFT SHOULDER, SUBSEQUENT ENCOUNTER: ICD-10-CM

## 2018-11-19 DIAGNOSIS — M25.312 SHOULDER INSTABILITY, LEFT: ICD-10-CM

## 2018-11-19 PROCEDURE — 0PBB4ZZ EXCISION OF LEFT CLAVICLE, PERCUTANEOUS ENDOSCOPIC APPROACH: ICD-10-PCS | Performed by: ORTHOPAEDIC SURGERY

## 2018-11-19 PROCEDURE — 76942 ECHO GUIDE FOR BIOPSY: CPT | Performed by: ORTHOPAEDIC SURGERY

## 2018-11-19 PROCEDURE — 0RNK4ZZ RELEASE LEFT SHOULDER JOINT, PERCUTANEOUS ENDOSCOPIC APPROACH: ICD-10-PCS | Performed by: ORTHOPAEDIC SURGERY

## 2018-11-19 PROCEDURE — 3E0T3BZ INTRODUCTION OF ANESTHETIC AGENT INTO PERIPHERAL NERVES AND PLEXI, PERCUTANEOUS APPROACH: ICD-10-PCS | Performed by: ANESTHESIOLOGY

## 2018-11-19 PROCEDURE — 0RQK4ZZ REPAIR LEFT SHOULDER JOINT, PERCUTANEOUS ENDOSCOPIC APPROACH: ICD-10-PCS | Performed by: ORTHOPAEDIC SURGERY

## 2018-11-19 RX ORDER — SODIUM CHLORIDE, SODIUM LACTATE, POTASSIUM CHLORIDE, CALCIUM CHLORIDE 600; 310; 30; 20 MG/100ML; MG/100ML; MG/100ML; MG/100ML
INJECTION, SOLUTION INTRAVENOUS CONTINUOUS
Status: DISCONTINUED | OUTPATIENT
Start: 2018-11-19 | End: 2018-11-19

## 2018-11-19 RX ORDER — LABETALOL HYDROCHLORIDE 5 MG/ML
5 INJECTION, SOLUTION INTRAVENOUS EVERY 5 MIN PRN
Status: DISCONTINUED | OUTPATIENT
Start: 2018-11-19 | End: 2018-11-19

## 2018-11-19 RX ORDER — MORPHINE SULFATE 4 MG/ML
2 INJECTION, SOLUTION INTRAMUSCULAR; INTRAVENOUS EVERY 5 MIN PRN
Status: DISCONTINUED | OUTPATIENT
Start: 2018-11-19 | End: 2018-11-19

## 2018-11-19 RX ORDER — HYDROCODONE BITARTRATE AND ACETAMINOPHEN 5; 325 MG/1; MG/1
2 TABLET ORAL AS NEEDED
Status: COMPLETED | OUTPATIENT
Start: 2018-11-19 | End: 2018-11-19

## 2018-11-19 RX ORDER — CLINDAMYCIN PHOSPHATE 900 MG/50ML
INJECTION INTRAVENOUS
Status: DISCONTINUED
Start: 2018-11-19 | End: 2018-11-19

## 2018-11-19 RX ORDER — ACETAMINOPHEN 500 MG
1000 TABLET ORAL ONCE
Status: DISCONTINUED | OUTPATIENT
Start: 2018-11-19 | End: 2018-11-19

## 2018-11-19 RX ORDER — ACETAMINOPHEN 500 MG
1000 TABLET ORAL ONCE
COMMUNITY

## 2018-11-19 RX ORDER — HYDROCODONE BITARTRATE AND ACETAMINOPHEN 5; 325 MG/1; MG/1
1 TABLET ORAL AS NEEDED
Status: COMPLETED | OUTPATIENT
Start: 2018-11-19 | End: 2018-11-19

## 2018-11-19 RX ORDER — ONDANSETRON 2 MG/ML
4 INJECTION INTRAMUSCULAR; INTRAVENOUS AS NEEDED
Status: DISCONTINUED | OUTPATIENT
Start: 2018-11-19 | End: 2018-11-19

## 2018-11-19 RX ORDER — SCOLOPAMINE TRANSDERMAL SYSTEM 1 MG/1
PATCH, EXTENDED RELEASE TRANSDERMAL
Status: DISCONTINUED
Start: 2018-11-19 | End: 2018-11-19 | Stop reason: WASHOUT

## 2018-11-19 RX ORDER — CLINDAMYCIN PHOSPHATE 900 MG/50ML
900 INJECTION INTRAVENOUS ONCE
Status: COMPLETED | OUTPATIENT
Start: 2018-11-19 | End: 2018-11-19

## 2018-11-19 RX ORDER — NALOXONE HYDROCHLORIDE 0.4 MG/ML
80 INJECTION, SOLUTION INTRAMUSCULAR; INTRAVENOUS; SUBCUTANEOUS AS NEEDED
Status: DISCONTINUED | OUTPATIENT
Start: 2018-11-19 | End: 2018-11-19

## 2018-11-19 RX ORDER — MEPERIDINE HYDROCHLORIDE 25 MG/ML
12.5 INJECTION INTRAMUSCULAR; INTRAVENOUS; SUBCUTANEOUS AS NEEDED
Status: DISCONTINUED | OUTPATIENT
Start: 2018-11-19 | End: 2018-11-19

## 2018-11-19 RX ORDER — DIPHENHYDRAMINE HYDROCHLORIDE 50 MG/ML
12.5 INJECTION INTRAMUSCULAR; INTRAVENOUS AS NEEDED
Status: DISCONTINUED | OUTPATIENT
Start: 2018-11-19 | End: 2018-11-19

## 2018-11-19 RX ORDER — MIDAZOLAM HYDROCHLORIDE 1 MG/ML
1 INJECTION INTRAMUSCULAR; INTRAVENOUS EVERY 5 MIN PRN
Status: DISCONTINUED | OUTPATIENT
Start: 2018-11-19 | End: 2018-11-19

## 2018-11-19 NOTE — BRIEF OP NOTE
Pre-Operative Diagnosis: Superior glenoid labrum lesion of left shoulder, subsequent encounter [S43.432D]  Shoulder instability, left [M25.312]     Post-Operative Diagnosis: same      Procedure Performed:   Procedure(s):  LEFT SHOULDER ARTHROSCOPY posterio

## 2018-11-20 NOTE — OPERATIVE REPORT
[address]    PATIENT'S NAME: Orquidea Langford   ATTENDING PHYSICIAN: Long Haile M.D. OPERATING PHYSICIAN: Long Haile M.D.    PATIENT ACCOUNT#:   [de-identified]    [facility]  MEDICAL RECORD #:   [id number] shoulder was examined. There was a type 1 to 2 SLAP tear, but only a small amount of peel back. Given her cervical spine issues, it was felt that repair may cause significant stiffness. We decided to avoid a repair.   Biceps also was not subluxing and th were closed with 4-0 nylon. A sterile dressing was applied. The patient was then taken to the postanesthesia care unit in stable condition. Carey Rodriguez, the physician assistant, was necessary to perform the case.   He was essential in all parts of the

## 2019-03-01 ENCOUNTER — OFFICE VISIT (OUTPATIENT)
Dept: FAMILY MEDICINE CLINIC | Facility: CLINIC | Age: 49
End: 2019-03-01
Payer: COMMERCIAL

## 2019-03-01 VITALS
TEMPERATURE: 98 F | DIASTOLIC BLOOD PRESSURE: 84 MMHG | SYSTOLIC BLOOD PRESSURE: 142 MMHG | HEART RATE: 120 BPM | RESPIRATION RATE: 16 BRPM | BODY MASS INDEX: 21.05 KG/M2 | OXYGEN SATURATION: 99 % | HEIGHT: 66 IN | WEIGHT: 131 LBS

## 2019-03-01 DIAGNOSIS — K21.9 GASTROESOPHAGEAL REFLUX DISEASE, ESOPHAGITIS PRESENCE NOT SPECIFIED: Primary | ICD-10-CM

## 2019-03-01 DIAGNOSIS — R21 RASH: ICD-10-CM

## 2019-03-01 PROCEDURE — 99214 OFFICE O/P EST MOD 30 MIN: CPT | Performed by: FAMILY MEDICINE

## 2019-03-01 NOTE — PATIENT INSTRUCTIONS
TAKE ZANTAC(RANITIDINE) 150MG TWICE A DAY AS NEEDED FOR GERD/ACIDITY. AFTER DOING H.PYLORI BREATH TEST ON Monday, START TAKING NEXIUM 20MG (2 CAPSULES DAILY) FOR A TOTAL OF 40MG DAILY EVERYDAY. FOLLOW UP WITH ME IN 2 WEEKS.

## 2019-03-01 NOTE — PROGRESS NOTES
HPI:   Dakota Nieto is a 53year old female that presents for Patient presents with:  Gastro-esophageal Reflux: patient states that her symptoms have gotten worse- patient would like to discuss alternative medications- pt states she feels like there i no eye discharge    Ears: External normal. TMs normal without erythema or effusion   Nose: patent, no nasal discharge    Throat:  No tonsillar erythema or exudate. Mouth:  No oral lesions or ulcerations, good dentition.   NECK: Supple, no cervical LAD, alternatives of current treatment plan discussed in detail. Questions and concerns addressed. Red flags to RTC or ED reviewed. Patient (or parent) agrees to plan. No Follow-up on file. Gerhardt Guile M.D.   Family Medicine   3/1/2019  11:33 AM

## 2019-03-04 ENCOUNTER — APPOINTMENT (OUTPATIENT)
Dept: LAB | Age: 49
End: 2019-03-04
Attending: FAMILY MEDICINE
Payer: COMMERCIAL

## 2019-03-06 ENCOUNTER — APPOINTMENT (OUTPATIENT)
Dept: LAB | Age: 49
End: 2019-03-06
Attending: FAMILY MEDICINE
Payer: COMMERCIAL

## 2019-03-06 DIAGNOSIS — K21.9 GASTROESOPHAGEAL REFLUX DISEASE, ESOPHAGITIS PRESENCE NOT SPECIFIED: ICD-10-CM

## 2019-03-06 PROCEDURE — 83013 H PYLORI (C-13) BREATH: CPT | Performed by: FAMILY MEDICINE

## 2019-03-07 LAB — H. PYLORI BREATH TEST: NEGATIVE

## 2019-03-12 ENCOUNTER — OFFICE VISIT (OUTPATIENT)
Dept: FAMILY MEDICINE CLINIC | Facility: CLINIC | Age: 49
End: 2019-03-12
Payer: COMMERCIAL

## 2019-03-12 VITALS
HEART RATE: 98 BPM | SYSTOLIC BLOOD PRESSURE: 144 MMHG | BODY MASS INDEX: 20.89 KG/M2 | OXYGEN SATURATION: 98 % | DIASTOLIC BLOOD PRESSURE: 80 MMHG | RESPIRATION RATE: 12 BRPM | HEIGHT: 66 IN | TEMPERATURE: 98 F | WEIGHT: 130 LBS

## 2019-03-12 DIAGNOSIS — K21.9 GASTROESOPHAGEAL REFLUX DISEASE, ESOPHAGITIS PRESENCE NOT SPECIFIED: Primary | ICD-10-CM

## 2019-03-12 DIAGNOSIS — R10.13 EPIGASTRIC PAIN: ICD-10-CM

## 2019-03-12 DIAGNOSIS — R19.8 GLOBUS SENSATION: ICD-10-CM

## 2019-03-12 PROBLEM — R09.89 GLOBUS SENSATION: Status: ACTIVE | Noted: 2019-03-12

## 2019-03-12 PROCEDURE — 99214 OFFICE O/P EST MOD 30 MIN: CPT | Performed by: FAMILY MEDICINE

## 2019-03-12 RX ORDER — RANITIDINE 150 MG/1
150 CAPSULE ORAL 2 TIMES DAILY
COMMUNITY
End: 2019-08-27

## 2019-03-12 NOTE — PROGRESS NOTES
HPI:   Vernita Leventhal is a 53year old female that presents for GERD    Patient is here for follow up on GERD. Patient had H.Pylori testing done  Patient has been using nexium and zantac OTC with relief.     Patient states that the sensation of something 20.98 kg/m² as calculated from the following:    Height as of this encounter: 66\". Weight as of this encounter: 130 lb. Vital signs reviewed. Appears stated age, well groomed.   Physical Exam:  GEN:  Patient is alert, awake and oriented, well developed 3/12/2019  9:16 AM

## 2019-03-20 ENCOUNTER — TELEPHONE (OUTPATIENT)
Dept: FAMILY MEDICINE CLINIC | Facility: CLINIC | Age: 49
End: 2019-03-20

## 2019-03-20 RX ORDER — PANTOPRAZOLE SODIUM 40 MG/1
40 TABLET, DELAYED RELEASE ORAL
Qty: 90 TABLET | Refills: 0 | Status: SHIPPED | OUTPATIENT
Start: 2019-03-20 | End: 2019-06-11

## 2019-03-20 NOTE — TELEPHONE ENCOUNTER
Pt informed of both test results and recommendations and she expressed understanding and agreement. Task completed.

## 2019-03-20 NOTE — TELEPHONE ENCOUNTER
Patient to stop Nexium. Start Protonix 40 mg daily. Medication sent to pharmacy. May use Zantac 150 mg twice a day as needed. Chantix Starter pack and 2 refills of regular pack sent to pharmacy as well. Follow instructions on package.   Set a quit da

## 2019-03-20 NOTE — TELEPHONE ENCOUNTER
Please advise     1. PT is looking to quit smoking, requesting RX for Chantix  2. Is requesting an RX for GERD, currently taking OTC Nexium and Zantac, the esomeprazole did not help.

## 2019-03-20 NOTE — TELEPHONE ENCOUNTER
Patient has discussed smokiing cessation with Dr. Boyd Landis at previous appointments and would like to know if she can have a prescription for Chantix.   Patient also currently takes Nexium and Zantac for GERD, but would like to know if there is anything else

## 2019-06-13 NOTE — TELEPHONE ENCOUNTER
Requesting : PANTOPRAZOLE SODIUM 40 MG   LOV: 3/12/19  RTC: No Follow-up on file    Last Relevant Labs: 11/8/18  Filled: 3/20/19 # 90 with 0 refills    No future appointments.

## 2019-06-14 RX ORDER — PANTOPRAZOLE SODIUM 40 MG/1
TABLET, DELAYED RELEASE ORAL
Qty: 90 TABLET | Refills: 1 | Status: SHIPPED | OUTPATIENT
Start: 2019-06-14 | End: 2019-08-27

## 2019-07-10 NOTE — TELEPHONE ENCOUNTER
Requested Prescriptions     Pending Prescriptions Disp Refills   • Varenicline Tartrate (CHANTIX STARTING MONTH DESIREE) 0.5 MG X 11 & 1 MG X 42 Oral Misc [Pharmacy Med Name: Ines 64  3     Sig: USE AS DIRECTED PER PACKAGE INSTRUCTIONS

## 2019-07-15 ENCOUNTER — TELEPHONE (OUTPATIENT)
Dept: FAMILY MEDICINE CLINIC | Facility: CLINIC | Age: 49
End: 2019-07-15

## 2019-07-15 RX ORDER — VARENICLINE TARTRATE 1 MG/1
1 TABLET, FILM COATED ORAL 2 TIMES DAILY
Qty: 180 TABLET | Refills: 0 | Status: SHIPPED | OUTPATIENT
Start: 2019-07-15 | End: 2020-01-23

## 2019-07-15 RX ORDER — VARENICLINE TARTRATE 1 MG/1
1 TABLET, FILM COATED ORAL 2 TIMES DAILY
Qty: 1 PACKAGE | Refills: 2 | Status: SHIPPED | OUTPATIENT
Start: 2019-07-15 | End: 2019-07-15

## 2019-07-15 NOTE — TELEPHONE ENCOUNTER
[facility] pharmacy is requesting alternative to the Chantix started pack stating patient should be on the continuing month pack. Last Chantix started pack sent on 3/20/19 #1pkg with 2 refills. [facility] is requesting Chantix 1mg continuing month box.   Medication p

## 2019-07-15 NOTE — TELEPHONE ENCOUNTER
Correct rx pended for approval   Per Pharmacy patient should have 1 moth of starting desiree and the rest should be Chantix continuing desiree.      Medication Quantity Refills Start End   Varenicline Tartrate (CHANTIX STARTING MONTH DESIREE) 0.5 MG X 11 & 1 MG X 42 Or

## 2019-08-27 ENCOUNTER — OFFICE VISIT (OUTPATIENT)
Dept: FAMILY MEDICINE CLINIC | Facility: CLINIC | Age: 49
End: 2019-08-27
Payer: COMMERCIAL

## 2019-08-27 VITALS
HEIGHT: 66 IN | SYSTOLIC BLOOD PRESSURE: 124 MMHG | RESPIRATION RATE: 16 BRPM | WEIGHT: 139 LBS | OXYGEN SATURATION: 99 % | TEMPERATURE: 98 F | DIASTOLIC BLOOD PRESSURE: 80 MMHG | HEART RATE: 108 BPM | BODY MASS INDEX: 22.34 KG/M2

## 2019-08-27 DIAGNOSIS — Z87.891 PERSONAL HISTORY OF TOBACCO USE, PRESENTING HAZARDS TO HEALTH: ICD-10-CM

## 2019-08-27 DIAGNOSIS — Z12.31 ENCOUNTER FOR SCREENING MAMMOGRAM FOR MALIGNANT NEOPLASM OF BREAST: ICD-10-CM

## 2019-08-27 DIAGNOSIS — Z00.00 WELL ADULT EXAM: Primary | ICD-10-CM

## 2019-08-27 DIAGNOSIS — Z23 NEED FOR PNEUMOCOCCAL VACCINE: ICD-10-CM

## 2019-08-27 DIAGNOSIS — Z00.00 ROUTINE GENERAL MEDICAL EXAMINATION AT A HEALTH CARE FACILITY: ICD-10-CM

## 2019-08-27 DIAGNOSIS — Z23 NEED FOR TDAP VACCINATION: ICD-10-CM

## 2019-08-27 DIAGNOSIS — F17.210 CIGARETTE SMOKER: ICD-10-CM

## 2019-08-27 DIAGNOSIS — Z76.89 ENCOUNTER TO ESTABLISH CARE: ICD-10-CM

## 2019-08-27 PROBLEM — S46.812A TRAPEZIUS STRAIN, LEFT, INITIAL ENCOUNTER: Status: RESOLVED | Noted: 2017-04-24 | Resolved: 2019-08-27

## 2019-08-27 PROBLEM — K21.9 GASTROESOPHAGEAL REFLUX DISEASE: Status: RESOLVED | Noted: 2019-03-01 | Resolved: 2019-08-27

## 2019-08-27 PROBLEM — L03.113 CELLULITIS OF RIGHT HAND: Status: RESOLVED | Noted: 2018-05-22 | Resolved: 2019-08-27

## 2019-08-27 PROBLEM — F32.A DEPRESSION: Status: RESOLVED | Noted: 2017-12-07 | Resolved: 2019-08-27

## 2019-08-27 PROBLEM — R10.13 EPIGASTRIC PAIN: Status: RESOLVED | Noted: 2019-03-12 | Resolved: 2019-08-27

## 2019-08-27 PROBLEM — M54.12 CERVICAL RADICULOPATHY: Status: RESOLVED | Noted: 2018-07-31 | Resolved: 2019-08-27

## 2019-08-27 PROBLEM — M54.12 CERVICAL RADICULAR PAIN: Status: RESOLVED | Noted: 2017-09-22 | Resolved: 2019-08-27

## 2019-08-27 PROBLEM — R21 RASH: Status: RESOLVED | Noted: 2019-03-01 | Resolved: 2019-08-27

## 2019-08-27 PROCEDURE — 99396 PREV VISIT EST AGE 40-64: CPT | Performed by: FAMILY MEDICINE

## 2019-08-27 PROCEDURE — 90472 IMMUNIZATION ADMIN EACH ADD: CPT | Performed by: FAMILY MEDICINE

## 2019-08-27 PROCEDURE — 90715 TDAP VACCINE 7 YRS/> IM: CPT | Performed by: FAMILY MEDICINE

## 2019-08-27 PROCEDURE — 90732 PPSV23 VACC 2 YRS+ SUBQ/IM: CPT | Performed by: FAMILY MEDICINE

## 2019-08-27 PROCEDURE — 90471 IMMUNIZATION ADMIN: CPT | Performed by: FAMILY MEDICINE

## 2019-08-27 PROCEDURE — 99407 BEHAV CHNG SMOKING > 10 MIN: CPT | Performed by: FAMILY MEDICINE

## 2019-08-27 NOTE — PROGRESS NOTES
Patient presents with:  Physical: Routine annual physical with fasting labs due. Mammogram due 10/2/2019. Pt is a current everyday smoker and takes chantix every now and then. She is due due for pneumovax 23. HPI:    She is a current smoker.  She has b Osteoarthritis of cervical spine, unspecified spinal osteoarthritis complication status     DDD (degenerative disc disease), cervical     Facet arthropathy, cervical     Labral tear of shoulder, left, subsequent encounter     Globus sensation      Past Varenicline Tartrate (CHANTIX) 1 MG Oral Tab Take 1 tablet (1 mg total) by mouth 2 (two) times daily. Disp: 180 tablet Rfl: 0       Allergies    Gadavist [Gadobutro*    NAUSEA ONLY    Comment:Increased thirst, nausea.   Cephalexin              RASH    Healt 1. Encounter for screening mammogram for malignant neoplasm of breast  - ESTUARDO SCREENING BILAT (CPT=77067); Future    2. Routine general medical examination at a health care facility  - CBC WITH DIFFERENTIAL WITH PLATELET;  Future  - COMP METABOLIC PANEL (14) Patient Instructions       Prevention Guidelines, Women Ages 36 to 52  Screening tests and vaccines are an important part of managing your health. A screening test is done to find diseases in people who don't have any symptoms.  The goal is to find a diseas Colorectal cancer Women age 39 years and older at average risk  Multiple tests are available and are used at different times.  Possible tests include:  · Flexible sigmoidoscopy every 5 years, or  · Colonoscopy every 10 years, or  · CT colonography (virtual Vaccine Who needs it How often   Chickenpox (varicella) All women in this age group who have no record of this infection or vaccine 2 doses; the second dose should be given at least 4 weeks after the first dose   Hepatitis A Women at increased risk for inf Use of tobacco and the health effects it can cause All women in this age group Every exam   1 American Diabetes Association  2 American College of Obstetricians and Gynecologists   3 [address]  [address] [facility]  Date Last R Start by giving up cigarettes at the times you least need them. Write down a few more ideas.  These might include writing down your triggers and avoiding them, joining a support group, keeping busy by finding new things to do, and rewarding yourself for you Advise: We gave clear, specific, and personalized behavior change advice, including information about personal health harms and benefits. Specifically, she was told that Quitting tobacco is one of the best things she can do for her health.  I strongly encou

## 2019-08-27 NOTE — PATIENT INSTRUCTIONS
Prevention Guidelines, Women Ages 36 to 52  Screening tests and vaccines are an important part of managing your health. A screening test is done to find diseases in people who don't have any symptoms.  The goal is to find a disease early so lifestyle giordano · Flexible sigmoidoscopy every 5 years, or  · Colonoscopy every 10 years, or  · CT colonography (virtual colonoscopy) every 5 years, or  · Yearly fecal occult blood test, or  · Yearly fecal immunochemical test every year, or  · Stool DNA test, every 3 year Chickenpox (varicella) All women in this age group who have no record of this infection or vaccine 2 doses; the second dose should be given at least 4 weeks after the first dose   Hepatitis A Women at increased risk for infection–talk with your healthcare Use of tobacco and the health effects it can cause All women in this age group Every exam   1 American Diabetes Association  2 American College of Obstetricians and Gynecologists   3 [address]  [address] [facility]  Date Last R Start by giving up cigarettes at the times you least need them. Write down a few more ideas.  These might include writing down your triggers and avoiding them, joining a support group, keeping busy by finding new things to do, and rewarding yourself for you

## 2019-10-02 ENCOUNTER — HOSPITAL ENCOUNTER (OUTPATIENT)
Dept: MAMMOGRAPHY | Age: 49
Discharge: HOME | End: 2019-10-02
Attending: FAMILY MEDICINE
Payer: COMMERCIAL

## 2019-10-02 DIAGNOSIS — Z12.31 ENCOUNTER FOR SCREENING MAMMOGRAM FOR MALIGNANT NEOPLASM OF BREAST: ICD-10-CM

## 2019-10-02 PROCEDURE — 77067 SCR MAMMO BI INCL CAD: CPT | Performed by: FAMILY MEDICINE

## 2019-10-02 PROCEDURE — 77063 BREAST TOMOSYNTHESIS BI: CPT | Performed by: FAMILY MEDICINE

## 2019-10-15 ENCOUNTER — HOSPITAL ENCOUNTER (OUTPATIENT)
Dept: MAMMOGRAPHY | Age: 49
Discharge: HOME | End: 2019-10-15
Attending: FAMILY MEDICINE
Payer: COMMERCIAL

## 2019-10-15 DIAGNOSIS — R92.2 INCONCLUSIVE MAMMOGRAM: ICD-10-CM

## 2019-10-15 PROCEDURE — 77061 BREAST TOMOSYNTHESIS UNI: CPT | Performed by: FAMILY MEDICINE

## 2019-10-15 PROCEDURE — 77065 DX MAMMO INCL CAD UNI: CPT | Performed by: FAMILY MEDICINE

## 2019-10-24 ENCOUNTER — LAB ENCOUNTER (OUTPATIENT)
Dept: LAB | Age: 49
End: 2019-10-24
Attending: FAMILY MEDICINE
Payer: COMMERCIAL

## 2019-10-24 DIAGNOSIS — Z00.00 ROUTINE GENERAL MEDICAL EXAMINATION AT A HEALTH CARE FACILITY: ICD-10-CM

## 2019-10-24 PROCEDURE — 80061 LIPID PANEL: CPT | Performed by: FAMILY MEDICINE

## 2019-10-24 PROCEDURE — 85025 COMPLETE CBC W/AUTO DIFF WBC: CPT | Performed by: FAMILY MEDICINE

## 2019-10-24 PROCEDURE — 80053 COMPREHEN METABOLIC PANEL: CPT | Performed by: FAMILY MEDICINE

## 2019-10-24 PROCEDURE — 36415 COLL VENOUS BLD VENIPUNCTURE: CPT | Performed by: FAMILY MEDICINE

## 2019-10-29 ENCOUNTER — TELEPHONE (OUTPATIENT)
Dept: FAMILY MEDICINE CLINIC | Facility: CLINIC | Age: 49
End: 2019-10-29

## 2019-10-29 NOTE — TELEPHONE ENCOUNTER
Attempted to call pt. Missed appt this morning at 8am. No answer and voicemail box is full.     pcp informed

## 2020-01-23 RX ORDER — VARENICLINE TARTRATE 1 MG/1
TABLET, FILM COATED ORAL
Qty: 180 TABLET | Refills: 0 | OUTPATIENT
Start: 2020-01-23

## 2020-01-23 NOTE — TELEPHONE ENCOUNTER
Requesting Varenicline Tartrate (CHANTIX) 1 MG Oral Tab  LOV: 8/27/19 caroline  3. Cigarette smoker  Smoking cessation encouraged and discussed in detail.  Conservative measures discussed including disposing of all cigarettes and ashtrays was discussed with

## 2020-08-24 ENCOUNTER — TELEPHONE (OUTPATIENT)
Dept: FAMILY MEDICINE CLINIC | Facility: CLINIC | Age: 50
End: 2020-08-24

## 2020-08-24 DIAGNOSIS — Z00.00 LABORATORY EXAM ORDERED AS PART OF ROUTINE GENERAL MEDICAL EXAMINATION: Primary | ICD-10-CM

## 2020-08-24 NOTE — TELEPHONE ENCOUNTER
Future Appointments   Date Time Provider Xander Colmenares   9/11/2020  8:30 AM Katie Luna, DO [facility] [address] [facility] [address]     Patient would like to do labs prior to her annual, please advise.

## 2020-09-11 ENCOUNTER — OFFICE VISIT (OUTPATIENT)
Dept: FAMILY MEDICINE CLINIC | Facility: CLINIC | Age: 50
End: 2020-09-11
Payer: COMMERCIAL

## 2020-09-11 VITALS
HEIGHT: 66 IN | TEMPERATURE: 98 F | HEART RATE: 88 BPM | DIASTOLIC BLOOD PRESSURE: 95 MMHG | SYSTOLIC BLOOD PRESSURE: 150 MMHG | BODY MASS INDEX: 22.66 KG/M2 | RESPIRATION RATE: 16 BRPM | OXYGEN SATURATION: 99 % | WEIGHT: 141 LBS

## 2020-09-11 DIAGNOSIS — Z13.39 ALCOHOL SCREENING: ICD-10-CM

## 2020-09-11 DIAGNOSIS — Z71.82 EXERCISE COUNSELING: ICD-10-CM

## 2020-09-11 DIAGNOSIS — Z00.00 WELL ADULT EXAM: Primary | ICD-10-CM

## 2020-09-11 DIAGNOSIS — Z76.89 ENCOUNTER TO ESTABLISH CARE: ICD-10-CM

## 2020-09-11 DIAGNOSIS — F17.210 CIGARETTE SMOKER: ICD-10-CM

## 2020-09-11 DIAGNOSIS — Z12.31 BREAST CANCER SCREENING BY MAMMOGRAM: ICD-10-CM

## 2020-09-11 DIAGNOSIS — I10 ESSENTIAL HYPERTENSION: ICD-10-CM

## 2020-09-11 DIAGNOSIS — Z13.31 DEPRESSION SCREENING: ICD-10-CM

## 2020-09-11 DIAGNOSIS — R03.0 ELEVATED BLOOD PRESSURE READING: ICD-10-CM

## 2020-09-11 PROCEDURE — 3008F BODY MASS INDEX DOCD: CPT | Performed by: FAMILY MEDICINE

## 2020-09-11 PROCEDURE — 3077F SYST BP >= 140 MM HG: CPT | Performed by: FAMILY MEDICINE

## 2020-09-11 PROCEDURE — 99213 OFFICE O/P EST LOW 20 MIN: CPT | Performed by: FAMILY MEDICINE

## 2020-09-11 PROCEDURE — 99396 PREV VISIT EST AGE 40-64: CPT | Performed by: FAMILY MEDICINE

## 2020-09-11 PROCEDURE — 3080F DIAST BP >= 90 MM HG: CPT | Performed by: FAMILY MEDICINE

## 2020-09-11 PROCEDURE — 99407 BEHAV CHNG SMOKING > 10 MIN: CPT | Performed by: FAMILY MEDICINE

## 2020-09-11 RX ORDER — NICOTINE 21 MG/24HR
1 PATCH, TRANSDERMAL 24 HOURS TRANSDERMAL EVERY 24 HOURS
Qty: 30 PATCH | Refills: 1 | Status: SHIPPED | OUTPATIENT
Start: 2020-09-11 | End: 2020-12-16

## 2020-09-11 RX ORDER — LISINOPRIL 20 MG/1
20 TABLET ORAL DAILY
Qty: 90 TABLET | Refills: 0 | Status: SHIPPED | OUTPATIENT
Start: 2020-09-11 | End: 2020-12-11

## 2020-09-11 RX ORDER — BUPROPION HYDROCHLORIDE 150 MG/1
TABLET, EXTENDED RELEASE ORAL
Qty: 180 TABLET | Refills: 1 | Status: SHIPPED | OUTPATIENT
Start: 2020-09-11 | End: 2021-03-11

## 2020-09-11 NOTE — PATIENT INSTRUCTIONS
Quitting Smoking    Quitting smoking is the most important step you can take to improve your health. We're glad you have set a goal to improve your health.     Quit Smoking Resources    In addition to medications, use the STAR plan to help you successfull as better health and an improved sense of taste, as well as the money you will save from not smoking. Also:   Remove cigarettes from your home, car, or any other place where you stash them.   Throw away all smoking materials, including matches, lighters, an medical care. Always follow your healthcare professional's instructions. Eating Heart-Healthy Food: Using the [address] for your heart doesn’t have to be hard or boring. You just need to know how to make healthier choices.  The DASH eating or 1% milk, low-fat or fat-free yogurt or buttermilk, and low-fat cheeses.         Lean meats, poultry, fish  Servings: 6 or fewer a day  A serving is:  1 ounce cooked meats, poultry, or fish  1 egg  Best choices: Lean poultry and fish.  Trim away visible f cause serious health problems. High blood pressure raises your risk for heart attack, stroke, heart disease, heart failure, kidney disease, and vision loss. With help from your doctor, you can manage your blood pressure and protect your health.   Blood pres help with weight loss. Cut back on salt. Limit canned, dried, packaged, and fast foods. Don’t add salt to your food at the table. Season foods with herbs instead of salt when you cook. Request no added salt when you go to a restaurant.   The American H numbness of your face, arms, or legs  Change in vision  Blood pressure measured at home that is greater than 180/110  Jose Luis last reviewed this educational content on 8/1/2019  © 0103-4757 [employer] [address].  [address] [address] you decide when to start screening. At age 39 start yearly mammograms. 3    Cervical cancer All women in this age group, except women who have had a complete hysterectomy Pap test every 3 years or Pap test plus human papilloma virus (HPV) test every 5 years your healthcare provider   Vision All women in this age group Complete exam at age 36 and eye exams every 2 to 4 years. If you have a chronic disease, ask your healthcare provider how often you should have your eyes examined. 4   Vaccine Who needs it How of age in which they are able to have children At routine exams   Sexually transmitted infection prevention Women at increased risk for infection–talk with your healthcare provider At routine exams   Use of tobacco and the health effects it can cause All wome

## 2020-09-11 NOTE — PROGRESS NOTES
Patient presents with:  Physical: annual wellness -      HPI:    She is a current smoker. She has been on chantix in the past. She started smoking when she was 19 years. She uses it a stress reliever. Smokes ten cigarettes per day.    She will get her flu numbness, tingling and headaches. Hematological: Negative for adenopathy. Does not bruise/bleed easily. Psychiatric/Behavioral: The patient is not nervous/anxious. No depression.     Patient Active Problem List:     Tobacco use     Abnormal glucose Smokeless tobacco: Never Used    Alcohol use: Yes      Comment: social     Drug use: No      Current Outpatient Medications   Medication Sig Dispense Refill   • buPROPion HCl ER, SR, 150 MG Oral Tablet 12 Hr Take 1 tablet (150 mg total) by mouth daily for wheezes, rhonchi or rales  Abdominal: Soft. Bowel sounds are normal. Non tender, no masses, no organomegaly or hernias. Musculoskeletal: Normal range of motion. Neurological: Normal reflexes. No cranial nerve deficit or sensory deficit.  Normal muscle ton drinking? 0   Have you ever had a drink first thing in the morning to steady your nerves or to get rid of a hangover (Eye opener)? 0   Total Score: Item responses on the CAGE are scored 0 or 1, with a higher score an indication of alcohol 0            7.  E with your quit date! · Tell friends, family, and coworkers your quit date. Request their understanding and support. · Anticipate and prepare for challenges. Some examples are withdrawal symptoms, being around others who smoke, and drinking alcohol.   · R your list of triggers and your plan for coping with them. Stay away from people or settings you link with smoking. Make a quit kit that includes gum, mints, carrot sticks, and things to keep your hands and mouth busy.   Talk to your healthcare provider ab to help you do just that. DASH stands for Dietary Approaches to Stop Hypertension. It is a plan that has been proven to be healthier for your heart and to lower your risk for high blood pressure.  It can also help lower your risk for cancer, heart disease, or boil instead of frying. Remove skin from poultry before eating. Limit how much red meat you eat.  Nuts, seeds, beans  Servings: 4 to 5 a week  A serving is:   One-third cup nuts (one and a half ounces)  2 tablespoons nut butter or seeds  Half a cup de given as 2 numbers. Systolic blood pressure is the upper number. This is the pressure when the heart contracts or pumps. Diastolic blood pressure is the lower number. This is the pressure when the heart relaxes between beats.   Blood pressure is categorized says the ideal amount of sodium is no more than 1,500 mg a day.  But because Americans eat so much salt, you can make a positive change by cutting back to even 2,300 mg of sodium a day (1 teaspoon).   Follow the DASH (Dietary Approaches to Stop Hypertensio This information is not intended as a substitute for professional medical care. Always follow your healthcare professional's instructions.         Prevention Guidelines, Women Ages 36 to 52  Screening tests and vaccines are an important part of managing you age 39 years and older at average risk Multiple tests are available and are used at different times.  Possible tests include:  Flexible sigmoidoscopy every 5 years, or  Colonoscopy every 10 years, or  CT colonography (virtual colonoscopy) every 5 years, or All women in this age group who have no record of this infection or vaccine 2 doses; the second dose should be given at least 4 weeks after the first dose   Hepatitis A Women at increased risk for infection–talk with your healthcare provider 2 doses given 1 American Diabetes Association  2 American College of Obstetricians and Gynecologists   3 American Cancer Society  4 American Academy of Ophthalmology  Jose Luis last reviewed this educational content on 11/1/2017  © 8745-7989 [employer] [address].  8 session with Sushma Lemus about her tobacco use risks and options using the USPSTF's Five A's approach:    Ask: [facility] is using tobacco products. Assess:  We asked about/assessed behavioral health risk and factors affecting choice of behavior change

## 2020-10-19 ENCOUNTER — HOSPITAL ENCOUNTER (OUTPATIENT)
Dept: MAMMOGRAPHY | Age: 50
Discharge: HOME | End: 2020-10-19
Attending: FAMILY MEDICINE
Payer: COMMERCIAL

## 2020-10-19 ENCOUNTER — LABORATORY ENCOUNTER (OUTPATIENT)
Dept: LAB | Age: 50
End: 2020-10-19
Attending: FAMILY MEDICINE
Payer: COMMERCIAL

## 2020-10-19 DIAGNOSIS — Z12.31 BREAST CANCER SCREENING BY MAMMOGRAM: ICD-10-CM

## 2020-10-19 DIAGNOSIS — Z00.00 LABORATORY EXAM ORDERED AS PART OF ROUTINE GENERAL MEDICAL EXAMINATION: ICD-10-CM

## 2020-10-19 PROCEDURE — 77067 SCR MAMMO BI INCL CAD: CPT | Performed by: FAMILY MEDICINE

## 2020-10-19 PROCEDURE — 77063 BREAST TOMOSYNTHESIS BI: CPT | Performed by: FAMILY MEDICINE

## 2020-10-19 PROCEDURE — 80050 GENERAL HEALTH PANEL: CPT | Performed by: FAMILY MEDICINE

## 2020-10-19 PROCEDURE — 84439 ASSAY OF FREE THYROXINE: CPT | Performed by: FAMILY MEDICINE

## 2020-10-19 PROCEDURE — 36415 COLL VENOUS BLD VENIPUNCTURE: CPT | Performed by: FAMILY MEDICINE

## 2020-10-19 PROCEDURE — 80061 LIPID PANEL: CPT | Performed by: FAMILY MEDICINE

## 2020-12-11 DIAGNOSIS — I10 ESSENTIAL HYPERTENSION: ICD-10-CM

## 2020-12-11 DIAGNOSIS — R03.0 ELEVATED BLOOD PRESSURE READING: ICD-10-CM

## 2020-12-11 RX ORDER — LISINOPRIL 20 MG/1
TABLET ORAL
Qty: 90 TABLET | Refills: 0 | Status: SHIPPED | OUTPATIENT
Start: 2020-12-11 | End: 2021-03-08

## 2020-12-14 DIAGNOSIS — F17.210 CIGARETTE SMOKER: ICD-10-CM

## 2020-12-16 RX ORDER — NICOTINE 21 MG/24HR
PATCH, TRANSDERMAL 24 HOURS TRANSDERMAL
Qty: 30 PATCH | Refills: 2 | Status: SHIPPED | OUTPATIENT
Start: 2020-12-16 | End: 2021-05-12

## 2020-12-16 NOTE — TELEPHONE ENCOUNTER
Requesting NICOTINE 14 MG/24HR Transdermal Patch 24 Hr  LOV: 9/11/20  RTC:   Last Relevant Labs: 10/19/20  Filled: 9/11/20  #30  with 1 refills    No future appointments.

## 2021-02-01 ENCOUNTER — TELEPHONE (OUTPATIENT)
Dept: FAMILY MEDICINE CLINIC | Facility: CLINIC | Age: 51
End: 2021-02-01

## 2021-02-01 NOTE — TELEPHONE ENCOUNTER
Pt called wanting to schedule appt with PCP or Dr. Jenene Cabot. Given pts symptoms, I tried to schedule a VV. 2 spots under arm pits that are on both sides.  scaley, lack of energy, lack of everything, sometimes they itch and burn (she is thinking maybe beginnin

## 2021-02-02 ENCOUNTER — TELEPHONE (OUTPATIENT)
Dept: FAMILY MEDICINE CLINIC | Facility: CLINIC | Age: 51
End: 2021-02-02

## 2021-02-02 NOTE — TELEPHONE ENCOUNTER
Future Appointments   Date Time Provider Xander Colmenares   2/3/2021 11:30 AM Alexa Figueroa MD [facility] [address]

## 2021-02-02 NOTE — TELEPHONE ENCOUNTER
Patient has scheduled visit on 2-3, patient will like to know what to do meanwhile. Patient lacks sleep and states skin is itchy-\"crawling out of her skin\" Please advise.

## 2021-02-02 NOTE — TELEPHONE ENCOUNTER
Patient has appt tomorrow morning. Wait for MD recommendations.   Future Appointments   Date Time Provider Xander Colmenares   2/3/2021 10:30 AM Alexa Figueroa MD [facility] [address]

## 2021-03-08 DIAGNOSIS — I10 ESSENTIAL HYPERTENSION: ICD-10-CM

## 2021-03-08 DIAGNOSIS — R03.0 ELEVATED BLOOD PRESSURE READING: ICD-10-CM

## 2021-03-08 RX ORDER — LISINOPRIL 20 MG/1
TABLET ORAL
Qty: 90 TABLET | Refills: 1 | Status: SHIPPED | OUTPATIENT
Start: 2021-03-08 | End: 2021-09-22

## 2021-03-08 NOTE — TELEPHONE ENCOUNTER
Hypertension Medications Protocol [id number]/08/2021 12:34 AM   CMP or BMP in past 12 months Protocol Details    Last serum creatinine< 2.0     Appointment in past 6 or next 3 months      Refill protocol passed because the patient met the following protocol

## 2021-03-10 ENCOUNTER — TELEPHONE (OUTPATIENT)
Dept: FAMILY MEDICINE CLINIC | Facility: CLINIC | Age: 51
End: 2021-03-10

## 2021-03-10 DIAGNOSIS — F17.210 CIGARETTE SMOKER: ICD-10-CM

## 2021-03-10 DIAGNOSIS — Z78.9 UNKNOWN STATUS OF IMMUNITY TO COVID-19 VIRUS: Primary | ICD-10-CM

## 2021-03-10 NOTE — TELEPHONE ENCOUNTER
Pt would like to have an order for the antibody test. She is pretty sure she had COVID. Please call patient once test is ordered.

## 2021-03-10 NOTE — TELEPHONE ENCOUNTER
Name from pharmacy: BUPROPION HCL  MG TABLET         Will file in chart as: BUPROPION HCL ER, SR, 150 MG Oral Tablet 12 Hr    Sig: TAKE 1 TABLET BY MOUTH EVERY DAY FOR 3 DAYS, THEN 1 TABLET TWICE A DAY    Disp:  180 tablet (Pharmacy requested: 180 U

## 2021-03-10 NOTE — TELEPHONE ENCOUNTER
Future Appointments   Date Time Provider Xander Colmenares   3/25/2021  8:00 AM Radha Luna, DO [facility] [address] [facility] [address]

## 2021-03-11 RX ORDER — BUPROPION HYDROCHLORIDE 150 MG/1
TABLET, EXTENDED RELEASE ORAL
Qty: 180 TABLET | Refills: 1 | Status: SHIPPED | OUTPATIENT
Start: 2021-03-11 | End: 2021-03-26

## 2021-03-26 ENCOUNTER — OFFICE VISIT (OUTPATIENT)
Dept: FAMILY MEDICINE CLINIC | Facility: CLINIC | Age: 51
End: 2021-03-26
Payer: COMMERCIAL

## 2021-03-26 ENCOUNTER — LAB ENCOUNTER (OUTPATIENT)
Dept: LAB | Age: 51
End: 2021-03-26
Attending: FAMILY MEDICINE
Payer: COMMERCIAL

## 2021-03-26 VITALS
BODY MASS INDEX: 23.63 KG/M2 | HEART RATE: 74 BPM | TEMPERATURE: 98 F | WEIGHT: 147 LBS | HEIGHT: 66 IN | DIASTOLIC BLOOD PRESSURE: 82 MMHG | SYSTOLIC BLOOD PRESSURE: 138 MMHG | RESPIRATION RATE: 16 BRPM | OXYGEN SATURATION: 99 %

## 2021-03-26 DIAGNOSIS — Z00.00 WELL ADULT EXAM: Primary | ICD-10-CM

## 2021-03-26 DIAGNOSIS — Z20.822 EXPOSURE TO COVID-19 VIRUS: ICD-10-CM

## 2021-03-26 DIAGNOSIS — Z23 NEED FOR SHINGLES VACCINE: ICD-10-CM

## 2021-03-26 DIAGNOSIS — F17.210 CIGARETTE SMOKER: ICD-10-CM

## 2021-03-26 DIAGNOSIS — Z12.11 SCREENING FOR COLON CANCER: ICD-10-CM

## 2021-03-26 DIAGNOSIS — Z12.4 CERVICAL CANCER SCREENING: ICD-10-CM

## 2021-03-26 LAB — SARS-COV-2 IGG+IGM SERPL QL IA: REACTIVE

## 2021-03-26 PROCEDURE — 88175 CYTOPATH C/V AUTO FLUID REDO: CPT | Performed by: FAMILY MEDICINE

## 2021-03-26 PROCEDURE — 3079F DIAST BP 80-89 MM HG: CPT | Performed by: FAMILY MEDICINE

## 2021-03-26 PROCEDURE — 99396 PREV VISIT EST AGE 40-64: CPT | Performed by: FAMILY MEDICINE

## 2021-03-26 PROCEDURE — 3008F BODY MASS INDEX DOCD: CPT | Performed by: FAMILY MEDICINE

## 2021-03-26 PROCEDURE — 86769 SARS-COV-2 COVID-19 ANTIBODY: CPT | Performed by: FAMILY MEDICINE

## 2021-03-26 PROCEDURE — 99407 BEHAV CHNG SMOKING > 10 MIN: CPT | Performed by: FAMILY MEDICINE

## 2021-03-26 PROCEDURE — 36415 COLL VENOUS BLD VENIPUNCTURE: CPT | Performed by: FAMILY MEDICINE

## 2021-03-26 PROCEDURE — 3075F SYST BP GE 130 - 139MM HG: CPT | Performed by: FAMILY MEDICINE

## 2021-03-26 PROCEDURE — 87624 HPV HI-RISK TYP POOLED RSLT: CPT | Performed by: FAMILY MEDICINE

## 2021-03-26 RX ORDER — CLINDAMYCIN PHOSPHATE 11.9 MG/ML
SOLUTION TOPICAL
COMMUNITY
Start: 2021-03-05

## 2021-03-26 RX ORDER — TRIAMCINOLONE ACETONIDE 0.25 MG/G
OINTMENT TOPICAL
COMMUNITY
Start: 2021-03-08

## 2021-03-26 RX ORDER — BUPROPION HYDROCHLORIDE 150 MG/1
150 TABLET, EXTENDED RELEASE ORAL 2 TIMES DAILY
Qty: 180 TABLET | Refills: 1 | Status: SHIPPED | OUTPATIENT
Start: 2021-03-26 | End: 2021-09-14

## 2021-03-26 RX ORDER — CLOBETASOL PROPIONATE 0.5 MG/G
OINTMENT TOPICAL
COMMUNITY
Start: 2021-03-05

## 2021-03-26 NOTE — PROGRESS NOTES
HPI:   Dayna Espinoza is a 53year old female that presents for well woman exam.   Patient presents with:  Gyn Exam: Well womens papsmear. Mammogram completed. Last labs were 10/19/2020. She is a current smoker.  She has been on chantix in the past. Visual impairment     glasses      Past Surgical History:   Procedure Laterality Date   • CERVICAL EPIDURAL N/A 11/29/2017    Performed by Funmilayo Wilkins MD at [address] N/A 10/19/2017    Performed by John Stover depression.       PHYSICAL EXAM:   /82   Pulse 74   Temp 97.8 °F (36.6 °C) (Temporal)   Resp 16   Ht 5' 6\" (1.676 m)   Wt 147 lb (66.7 kg)   LMP 10/22/2015   SpO2 99%   BMI 23.73 kg/m²  Estimated body mass index is 23.73 kg/m² as calculated from the consequences of continued tobacco use. - buPROPion HCl ER, SR, 150 MG Oral Tablet 12 Hr; Take 1 tablet (150 mg total) by mouth 2 (two) times daily. Dispense: 180 tablet;  Refill: 1  Discussed tobacco cessation with patient for more than 10 minutes and op

## 2021-03-29 LAB — HPV I/H RISK 1 DNA SPEC QL NAA+PROBE: NEGATIVE

## 2021-03-29 NOTE — PATIENT INSTRUCTIONS
Prevention Guidelines, Women Ages 36 to 52  Screening tests and vaccines are an important part of managing your health. A screening test is done to find diseases in people who don't have any symptoms.  The goal is to find a disease early so lifestyle giordano sigmoidoscopy every 5 years, or  · Colonoscopy every 10 years, or  · CT colonography (virtual colonoscopy) every 5 years, or  · Yearly fecal occult blood test, or  · Yearly fecal immunochemical test every year, or  · Stool DNA test, every 3 years  If you c least 4 weeks after the first dose   Hepatitis A Women at increased risk for infection–talk with your healthcare provider 2 doses given 6 months apart   Hepatitis B Women at increased risk for infection–talk with your healthcare provider 3 doses over 6 mon American Academy of Ophthalmology  Jose Luis last reviewed this educational content on 11/1/2017  © 4595-2075 [employer] [address]. All rights reserved. This information is not intended as a substitute for professional medical care.  Always follow your

## 2021-04-25 ENCOUNTER — IMMUNIZATION (OUTPATIENT)
Dept: LAB | Age: 51
End: 2021-04-25
Attending: HOSPITALIST
Payer: COMMERCIAL

## 2021-04-25 DIAGNOSIS — Z23 NEED FOR VACCINATION: Primary | ICD-10-CM

## 2021-04-25 PROCEDURE — 0001A SARSCOV2 VAC 30MCG/0.3ML IM: CPT

## 2021-05-12 DIAGNOSIS — F17.210 CIGARETTE SMOKER: ICD-10-CM

## 2021-05-12 RX ORDER — BUPROPION HYDROCHLORIDE 150 MG/1
150 TABLET, EXTENDED RELEASE ORAL 2 TIMES DAILY
Qty: 180 TABLET | Refills: 1 | Status: CANCELLED | OUTPATIENT
Start: 2021-05-12

## 2021-05-12 NOTE — TELEPHONE ENCOUNTER
nicotine 14 MG/24HR Transdermal Patch 24 Hr          Sig: Place 1 patch onto the skin daily.     Disp:  30 patch    Refills:  2    Start: 5/12/2021    Class: Normal    Non-formulary For: Cigarette smoker    Last ordered: 4 months ago by Shayla Townsend, DO

## 2021-05-14 RX ORDER — NICOTINE 21 MG/24HR
1 PATCH, TRANSDERMAL 24 HOURS TRANSDERMAL DAILY
Qty: 30 PATCH | Refills: 2 | Status: SHIPPED | OUTPATIENT
Start: 2021-05-14

## 2021-05-17 ENCOUNTER — IMMUNIZATION (OUTPATIENT)
Dept: LAB | Age: 51
End: 2021-05-17
Attending: HOSPITALIST
Payer: COMMERCIAL

## 2021-05-17 DIAGNOSIS — Z23 NEED FOR VACCINATION: Primary | ICD-10-CM

## 2021-05-17 PROCEDURE — 0002A SARSCOV2 VAC 30MCG/0.3ML IM: CPT

## 2021-05-19 PROBLEM — M54.12 CERVICAL RADICULOPATHY: Status: ACTIVE | Noted: 2021-05-19

## 2021-05-19 PROBLEM — M48.02 FORAMINAL STENOSIS OF CERVICAL REGION: Status: ACTIVE | Noted: 2021-05-19

## 2021-09-14 ENCOUNTER — TELEPHONE (OUTPATIENT)
Dept: FAMILY MEDICINE CLINIC | Facility: CLINIC | Age: 51
End: 2021-09-14

## 2021-09-14 ENCOUNTER — OFFICE VISIT (OUTPATIENT)
Dept: FAMILY MEDICINE CLINIC | Facility: CLINIC | Age: 51
End: 2021-09-14
Payer: COMMERCIAL

## 2021-09-14 VITALS
HEIGHT: 66 IN | SYSTOLIC BLOOD PRESSURE: 130 MMHG | HEART RATE: 76 BPM | DIASTOLIC BLOOD PRESSURE: 80 MMHG | TEMPERATURE: 98 F | WEIGHT: 144 LBS | RESPIRATION RATE: 16 BRPM | BODY MASS INDEX: 23.14 KG/M2 | OXYGEN SATURATION: 98 %

## 2021-09-14 DIAGNOSIS — Z00.00 WELL ADULT EXAM: Primary | ICD-10-CM

## 2021-09-14 DIAGNOSIS — F17.210 CIGARETTE SMOKER: ICD-10-CM

## 2021-09-14 DIAGNOSIS — Z23 NEED FOR SHINGLES VACCINE: ICD-10-CM

## 2021-09-14 DIAGNOSIS — I10 ESSENTIAL HYPERTENSION: ICD-10-CM

## 2021-09-14 DIAGNOSIS — Z12.31 ENCOUNTER FOR SCREENING MAMMOGRAM FOR MALIGNANT NEOPLASM OF BREAST: ICD-10-CM

## 2021-09-14 DIAGNOSIS — Z72.0 TOBACCO USE: ICD-10-CM

## 2021-09-14 DIAGNOSIS — F43.9 STRESS: ICD-10-CM

## 2021-09-14 DIAGNOSIS — M50.30 DDD (DEGENERATIVE DISC DISEASE), CERVICAL: ICD-10-CM

## 2021-09-14 DIAGNOSIS — M47.812 OSTEOARTHRITIS OF CERVICAL SPINE, UNSPECIFIED SPINAL OSTEOARTHRITIS COMPLICATION STATUS: ICD-10-CM

## 2021-09-14 DIAGNOSIS — Z12.11 ENCOUNTER FOR SCREENING COLONOSCOPY: ICD-10-CM

## 2021-09-14 PROCEDURE — 3008F BODY MASS INDEX DOCD: CPT | Performed by: FAMILY MEDICINE

## 2021-09-14 PROCEDURE — 3075F SYST BP GE 130 - 139MM HG: CPT | Performed by: FAMILY MEDICINE

## 2021-09-14 PROCEDURE — 90471 IMMUNIZATION ADMIN: CPT | Performed by: FAMILY MEDICINE

## 2021-09-14 PROCEDURE — 96127 BRIEF EMOTIONAL/BEHAV ASSMT: CPT | Performed by: FAMILY MEDICINE

## 2021-09-14 PROCEDURE — 90750 HZV VACC RECOMBINANT IM: CPT | Performed by: FAMILY MEDICINE

## 2021-09-14 PROCEDURE — 3079F DIAST BP 80-89 MM HG: CPT | Performed by: FAMILY MEDICINE

## 2021-09-14 PROCEDURE — 99396 PREV VISIT EST AGE 40-64: CPT | Performed by: FAMILY MEDICINE

## 2021-09-14 RX ORDER — BUPROPION HYDROCHLORIDE 150 MG/1
150 TABLET, EXTENDED RELEASE ORAL 2 TIMES DAILY
Qty: 180 TABLET | Refills: 1 | Status: SHIPPED | OUTPATIENT
Start: 2021-09-14

## 2021-09-14 NOTE — PROGRESS NOTES
HPI:   Jennifer Caldwell is a 49year old female that presents for well woman exam.   Patient presents with:  Physical: Well adult physical - will do cologuard  Immunization/Injection: Shingles vaccine      From hx last year: patient does not want to discu none   Sexual hx: 1 partner   STD hx: declined past hx of std   Occupation: stays at home   Mammogram: 2020, negative. She has mammogram scheduled   Colonoscopy: due - wants cologuard   Pap smear: up to date   Tdap: up to date.    Diet: healthy diet    Exe along with other people?: Very difficult, done 9/14/2021                REVIEW OF SYSTEMS:      Review of Systems   Constitutional: Negative for fever, chills and fatigue. No distress.   HENT: Negative for hearing loss, congestion, sore throat, neck pain an cervical LAD, no thyromegaly. SKIN: No rashes, no skin lesion, no bruising, good turgor. HEART:  Regular rate and rhythm, no murmurs, rubs or gallops. LUNGS: Clear to auscultation bilterally, no rales/rhonchi/wheezing.   ABDOMEN:  Soft, nondistended, non some hobbies or to find a job to keep her self busy. - OP [facility]FERRAL TO [facility]    Encounter for screening mammogram for malignant neoplasm of breast  - [facility] NABILA 2D+3D SCREENING BILAT (CPT=77067/01919);  Future     Need for shingles vaccine  Counseled on ri Repair Performed By Another Provider Text (Leave Blank If You Do Not Want): After obtaining clear surgical margins the defect was repaired by another provider.

## 2021-09-14 NOTE — TELEPHONE ENCOUNTER
Faxed signed [employer] Requisition to [employer] at [phone] along with face sheet and patients insurance. Requisition placed in scan.

## 2021-09-16 PROBLEM — F17.210 CIGARETTE SMOKER: Status: ACTIVE | Noted: 2021-09-16

## 2021-09-16 PROBLEM — F43.9 STRESS: Status: ACTIVE | Noted: 2021-09-16

## 2021-09-16 NOTE — PATIENT INSTRUCTIONS

## 2021-09-22 DIAGNOSIS — R03.0 ELEVATED BLOOD PRESSURE READING: ICD-10-CM

## 2021-09-22 DIAGNOSIS — I10 ESSENTIAL HYPERTENSION: ICD-10-CM

## 2021-09-22 RX ORDER — LISINOPRIL 20 MG/1
TABLET ORAL
Qty: 90 TABLET | Refills: 1 | Status: SHIPPED | OUTPATIENT
Start: 2021-09-22

## 2021-09-22 NOTE — TELEPHONE ENCOUNTER
Hypertension Medications Protocol Passed 09/22/2021 12:20 AM   Protocol Details  CMP or BMP in past 12 months    Last serum creatinine< 2.0    Appointment in past 6 or next 3 months     Refill protocol passed because the patient met the following protocol

## 2021-11-01 ENCOUNTER — LAB ENCOUNTER (OUTPATIENT)
Dept: LAB | Age: 51
End: 2021-11-01
Attending: FAMILY MEDICINE
Payer: COMMERCIAL

## 2021-11-01 DIAGNOSIS — Z00.00 WELL ADULT EXAM: ICD-10-CM

## 2021-11-01 PROCEDURE — 80050 GENERAL HEALTH PANEL: CPT | Performed by: FAMILY MEDICINE

## 2021-11-01 PROCEDURE — 80061 LIPID PANEL: CPT | Performed by: FAMILY MEDICINE

## 2022-01-27 ENCOUNTER — PATIENT MESSAGE (OUTPATIENT)
Dept: FAMILY MEDICINE CLINIC | Facility: CLINIC | Age: 52
End: 2022-01-27

## 2022-01-28 NOTE — TELEPHONE ENCOUNTER
From: Milly Yeboah  To: Brian Wright DO  Sent: 1/27/2022 7:34 PM CST  Subject: Shingles vaccine     When am I eligible to get my 2nd shot for shingles.

## 2022-01-31 ENCOUNTER — TELEPHONE (OUTPATIENT)
Dept: FAMILY MEDICINE CLINIC | Facility: CLINIC | Age: 52
End: 2022-01-31

## 2022-01-31 NOTE — TELEPHONE ENCOUNTER
Future Appointments   Date Time Provider Xander Colmenares   2/4/2022  9:00 AM [facility] N[facility]SE [facility] [address] [facility] [address]

## 2022-02-04 ENCOUNTER — NURSE ONLY (OUTPATIENT)
Dept: FAMILY MEDICINE CLINIC | Facility: CLINIC | Age: 52
End: 2022-02-04
Payer: COMMERCIAL

## 2022-02-04 DIAGNOSIS — Z23 NEED FOR SHINGLES VACCINE: Primary | ICD-10-CM

## 2022-02-04 PROCEDURE — 90750 HZV VACC RECOMBINANT IM: CPT | Performed by: FAMILY MEDICINE

## 2022-02-04 PROCEDURE — 90471 IMMUNIZATION ADMIN: CPT | Performed by: FAMILY MEDICINE

## 2022-02-04 NOTE — PROGRESS NOTES
Pt here for Shingrix #2, given in left deltoid. Pt tolerated well with no adverse events. VIS given to pt.

## 2022-03-21 RX ORDER — BUPROPION HYDROCHLORIDE 150 MG/1
TABLET, EXTENDED RELEASE ORAL
Qty: 180 TABLET | Refills: 1 | Status: SHIPPED | OUTPATIENT
Start: 2022-03-21

## 2022-06-21 ENCOUNTER — PATIENT MESSAGE (OUTPATIENT)
Dept: FAMILY MEDICINE CLINIC | Facility: CLINIC | Age: 52
End: 2022-06-21

## 2022-06-24 ENCOUNTER — TELEPHONE (OUTPATIENT)
Dept: FAMILY MEDICINE CLINIC | Facility: CLINIC | Age: 52
End: 2022-06-24

## 2022-06-24 NOTE — TELEPHONE ENCOUNTER
Called patient, reminded her to compete cologuard. Said it's somewhere in her basement. Advised that if she cannot find it we can resend it.

## 2022-06-27 ENCOUNTER — TELEPHONE (OUTPATIENT)
Dept: FAMILY MEDICINE CLINIC | Facility: CLINIC | Age: 52
End: 2022-06-27

## 2022-06-27 ENCOUNTER — OFFICE VISIT (OUTPATIENT)
Dept: FAMILY MEDICINE CLINIC | Facility: CLINIC | Age: 52
End: 2022-06-27
Payer: COMMERCIAL

## 2022-06-27 VITALS
RESPIRATION RATE: 16 BRPM | DIASTOLIC BLOOD PRESSURE: 80 MMHG | OXYGEN SATURATION: 98 % | HEIGHT: 66 IN | HEART RATE: 74 BPM | SYSTOLIC BLOOD PRESSURE: 136 MMHG | WEIGHT: 156 LBS | TEMPERATURE: 98 F | BODY MASS INDEX: 25.07 KG/M2

## 2022-06-27 DIAGNOSIS — Z12.31 VISIT FOR SCREENING MAMMOGRAM: ICD-10-CM

## 2022-06-27 DIAGNOSIS — R14.0 BLOATING: ICD-10-CM

## 2022-06-27 DIAGNOSIS — R10.9 ABDOMINAL CRAMPING: ICD-10-CM

## 2022-06-27 DIAGNOSIS — R19.7 DIARRHEA, UNSPECIFIED TYPE: Primary | ICD-10-CM

## 2022-06-27 DIAGNOSIS — R10.827 GENERALIZED ABDOMINAL TENDERNESS WITH REBOUND TENDERNESS: ICD-10-CM

## 2022-06-27 PROCEDURE — 3079F DIAST BP 80-89 MM HG: CPT | Performed by: FAMILY MEDICINE

## 2022-06-27 PROCEDURE — 3008F BODY MASS INDEX DOCD: CPT | Performed by: FAMILY MEDICINE

## 2022-06-27 PROCEDURE — 3075F SYST BP GE 130 - 139MM HG: CPT | Performed by: FAMILY MEDICINE

## 2022-06-27 PROCEDURE — 99214 OFFICE O/P EST MOD 30 MIN: CPT | Performed by: FAMILY MEDICINE

## 2022-06-27 NOTE — TELEPHONE ENCOUNTER
Pt called [facility]. Was able to schedule an appt for labs, but for the CT scan she was told they need prior authorization code or CPT code/diagnosis code/procedure code. Pt wondering if she also needs this for the labs. Was unable to schedule the CT scan as of yet. Future Appointments   Date Time Provider Xander Colmenares   6/28/2022  7:15 AM REF BK RD REF [id number] Ref Book 14   7/1/2022  8:30 AM PF CT RM1 PF [facility] [employer] told her that we need to call the EXPO to get prior auth for her labs/CT scan.

## 2022-06-27 NOTE — PATIENT INSTRUCTIONS
According to our records, you are due within 6 months for your annual mammography screening. Mammograms are the best method to detect breast cancer early when it is easier to treat and before it is big enough to feel or cause symptoms. Getting an annual mammography has been repeatedly proven to reduce the number of deaths from breast cancer by 40% to 50%. Due to the importance of this annual life saving examination, we encourage you to call us and schedule an appointment at your earliest convenience. We want to make it easy and convenient for you to have your mammogram.    Please call [phone] to make an appointment, or use the link below to go to our Online Scheduling. You can schedule an appointment at any of our convenient locations . [email or web address]  Your continued health care is important to us!   Sincerely,  Katelin Valdez,

## 2022-06-27 NOTE — TELEPHONE ENCOUNTER
Per Dr. Héctro Zaragoza, order STAT ct abdomen+pelvis to be done today. Called scheduling and scheduled for 4:30pm today. Dr. Héctor Zaragoza left message with pt to inform. The Online 401 message sent as well.

## 2022-06-27 NOTE — TELEPHONE ENCOUNTER
Spoke with pt, informed her CT scan has been authorized. Pt states she is not able to complete scan before Wednesday but was told there was an opening on Wednesday at 6:30 so she will call [facility] back to schedule it then, otherwise she is scheduled for 7/1/22. Pt is scheduled for labs tomorrow morning. Pt verbalized understanding and agreement. All questions answered.

## 2022-06-28 ENCOUNTER — LABORATORY ENCOUNTER (OUTPATIENT)
Dept: LAB | Age: 52
End: 2022-06-28
Attending: FAMILY MEDICINE
Payer: COMMERCIAL

## 2022-06-28 DIAGNOSIS — R10.9 ABDOMINAL CRAMPING: ICD-10-CM

## 2022-06-28 DIAGNOSIS — R19.7 DIARRHEA, UNSPECIFIED TYPE: ICD-10-CM

## 2022-06-28 DIAGNOSIS — R14.0 BLOATING: ICD-10-CM

## 2022-06-28 DIAGNOSIS — R10.827 GENERALIZED ABDOMINAL TENDERNESS WITH REBOUND TENDERNESS: ICD-10-CM

## 2022-06-28 LAB
ALBUMIN SERPL-MCNC: 4.4 G/DL (ref 3.4–5)
ALBUMIN/GLOB SERPL: 1.3 {RATIO} (ref 1–2)
ALP LIVER SERPL-CCNC: 89 U/L
ALT SERPL-CCNC: 79 U/L
ANION GAP SERPL CALC-SCNC: 11 MMOL/L (ref 0–18)
AST SERPL-CCNC: 74 U/L (ref 15–37)
BASOPHILS # BLD AUTO: 0.06 X10(3) UL (ref 0–0.2)
BASOPHILS NFR BLD AUTO: 1.8 %
BILIRUB SERPL-MCNC: 0.6 MG/DL (ref 0.1–2)
BUN BLD-MCNC: 7 MG/DL (ref 7–18)
CALCIUM BLD-MCNC: 9.6 MG/DL (ref 8.5–10.1)
CHLORIDE SERPL-SCNC: 99 MMOL/L (ref 98–112)
CO2 SERPL-SCNC: 24 MMOL/L (ref 21–32)
CREAT BLD-MCNC: 0.64 MG/DL
EOSINOPHIL # BLD AUTO: 0.07 X10(3) UL (ref 0–0.7)
EOSINOPHIL NFR BLD AUTO: 2.1 %
ERYTHROCYTE [DISTWIDTH] IN BLOOD BY AUTOMATED COUNT: 14.2 %
FASTING STATUS PATIENT QL REPORTED: YES
GLOBULIN PLAS-MCNC: 3.5 G/DL (ref 2.8–4.4)
GLUCOSE BLD-MCNC: 73 MG/DL (ref 70–99)
HCT VFR BLD AUTO: 41.4 %
HGB BLD-MCNC: 13.8 G/DL
IMM GRANULOCYTES # BLD AUTO: 0.01 X10(3) UL (ref 0–1)
IMM GRANULOCYTES NFR BLD: 0.3 %
LIPASE SERPL-CCNC: 78 U/L (ref 73–393)
LYMPHOCYTES # BLD AUTO: 1.37 X10(3) UL (ref 1–4)
LYMPHOCYTES NFR BLD AUTO: 40.4 %
MCH RBC QN AUTO: 33 PG (ref 26–34)
MCHC RBC AUTO-ENTMCNC: 33.3 G/DL (ref 31–37)
MCV RBC AUTO: 99 FL
MONOCYTES # BLD AUTO: 0.38 X10(3) UL (ref 0.1–1)
MONOCYTES NFR BLD AUTO: 11.2 %
NEUTROPHILS # BLD AUTO: 1.5 X10 (3) UL (ref 1.5–7.7)
NEUTROPHILS # BLD AUTO: 1.5 X10(3) UL (ref 1.5–7.7)
NEUTROPHILS NFR BLD AUTO: 44.2 %
OSMOLALITY SERPL CALC.SUM OF ELEC: 275 MOSM/KG (ref 275–295)
PLATELET # BLD AUTO: 304 10(3)UL (ref 150–450)
POTASSIUM SERPL-SCNC: 3.9 MMOL/L (ref 3.5–5.1)
PROT SERPL-MCNC: 7.9 G/DL (ref 6.4–8.2)
RBC # BLD AUTO: 4.18 X10(6)UL
SODIUM SERPL-SCNC: 134 MMOL/L (ref 136–145)
WBC # BLD AUTO: 3.4 X10(3) UL (ref 4–11)

## 2022-06-28 PROCEDURE — 85025 COMPLETE CBC W/AUTO DIFF WBC: CPT | Performed by: FAMILY MEDICINE

## 2022-06-28 PROCEDURE — 89055 LEUKOCYTE ASSESSMENT FECAL: CPT | Performed by: FAMILY MEDICINE

## 2022-06-28 PROCEDURE — 87209 SMEAR COMPLEX STAIN: CPT | Performed by: FAMILY MEDICINE

## 2022-06-28 PROCEDURE — 83690 ASSAY OF LIPASE: CPT | Performed by: FAMILY MEDICINE

## 2022-06-28 PROCEDURE — 87329 GIARDIA AG IA: CPT | Performed by: FAMILY MEDICINE

## 2022-06-28 PROCEDURE — 87493 C DIFF AMPLIFIED PROBE: CPT | Performed by: FAMILY MEDICINE

## 2022-06-28 PROCEDURE — 80053 COMPREHEN METABOLIC PANEL: CPT | Performed by: FAMILY MEDICINE

## 2022-06-28 PROCEDURE — 87272 CRYPTOSPORIDIUM AG IF: CPT | Performed by: FAMILY MEDICINE

## 2022-06-28 PROCEDURE — 87177 OVA AND PARASITES SMEARS: CPT | Performed by: FAMILY MEDICINE

## 2022-06-28 PROCEDURE — 87046 STOOL CULTR AEROBIC BACT EA: CPT | Performed by: FAMILY MEDICINE

## 2022-06-28 PROCEDURE — 87045 FECES CULTURE AEROBIC BACT: CPT | Performed by: FAMILY MEDICINE

## 2022-06-28 PROCEDURE — 87427 SHIGA-LIKE TOXIN AG IA: CPT | Performed by: FAMILY MEDICINE

## 2022-06-29 ENCOUNTER — HOSPITAL ENCOUNTER (OUTPATIENT)
Dept: CT IMAGING | Age: 52
Discharge: HOME | End: 2022-06-29
Attending: FAMILY MEDICINE
Payer: COMMERCIAL

## 2022-06-29 ENCOUNTER — TELEPHONE (OUTPATIENT)
Dept: FAMILY MEDICINE CLINIC | Facility: CLINIC | Age: 52
End: 2022-06-29

## 2022-06-29 DIAGNOSIS — R14.0 BLOATING: ICD-10-CM

## 2022-06-29 DIAGNOSIS — R10.9 ABDOMINAL CRAMPING: ICD-10-CM

## 2022-06-29 DIAGNOSIS — R19.7 DIARRHEA, UNSPECIFIED TYPE: ICD-10-CM

## 2022-06-29 DIAGNOSIS — R79.89 ABNORMAL CBC: Primary | ICD-10-CM

## 2022-06-29 DIAGNOSIS — R10.827 GENERALIZED ABDOMINAL TENDERNESS WITH REBOUND TENDERNESS: ICD-10-CM

## 2022-06-29 DIAGNOSIS — N30.90 CYSTITIS: Primary | ICD-10-CM

## 2022-06-29 LAB
C DIFF TOX B STL QL: NEGATIVE
CRYPTOSP AG STL QL IA: NEGATIVE
G LAMBLIA AG STL QL IA: NEGATIVE

## 2022-06-29 PROCEDURE — 74177 CT ABD & PELVIS W/CONTRAST: CPT | Performed by: FAMILY MEDICINE

## 2022-06-29 RX ORDER — NITROFURANTOIN 25; 75 MG/1; MG/1
100 CAPSULE ORAL 2 TIMES DAILY
Qty: 14 CAPSULE | Refills: 0 | Status: SHIPPED | OUTPATIENT
Start: 2022-06-29 | End: 2022-07-06

## 2022-06-29 NOTE — TELEPHONE ENCOUNTER
Junior Carpenter,    6/29/2022 10:24 AM CDT         I sent Zackery Evi a message. But I would like to forward this to the nursing staff as well. Her CT scan shows possible cystitis. I would like her to go to the lab today and get a urine analysis and a urine culture. There after, I would like for her to start Macrobid 100 mg b.i.d. for seven days. Please call the patient and notify her of the results. [employer] message also sent to pt.

## 2022-06-29 NOTE — TELEPHONE ENCOUNTER
I left a detailed message on patients phone, I asked that she get a urine analysis and culture today. Dr Shila Moran is sending in a antibiotic but she is not to start it until after she gives the urine sample. I gave her our number to call with any questions.     RX sent to pharmacy

## 2022-06-30 ENCOUNTER — PATIENT MESSAGE (OUTPATIENT)
Dept: FAMILY MEDICINE CLINIC | Facility: CLINIC | Age: 52
End: 2022-06-30

## 2022-06-30 NOTE — TELEPHONE ENCOUNTER
Pt will be going to do urine test tomorrow at 7am.  RX picked up and will start tomorrow. Cologard was also mailed Tuesday by Jeanie Roque.

## 2022-07-01 ENCOUNTER — LAB ENCOUNTER (OUTPATIENT)
Dept: LAB | Age: 52
End: 2022-07-01
Attending: FAMILY MEDICINE
Payer: COMMERCIAL

## 2022-07-01 DIAGNOSIS — N30.90 CYSTITIS: ICD-10-CM

## 2022-07-01 LAB
BILIRUB UR QL STRIP.AUTO: NEGATIVE
COLOR UR AUTO: YELLOW
GLUCOSE UR STRIP.AUTO-MCNC: NEGATIVE MG/DL
KETONES UR STRIP.AUTO-MCNC: NEGATIVE MG/DL
NITRITE UR QL STRIP.AUTO: NEGATIVE
PH UR STRIP.AUTO: 6 [PH] (ref 5–8)
PROT UR STRIP.AUTO-MCNC: NEGATIVE MG/DL
SP GR UR STRIP.AUTO: 1 (ref 1–1.03)
UROBILINOGEN UR STRIP.AUTO-MCNC: <2 MG/DL

## 2022-07-01 PROCEDURE — 87186 SC STD MICRODIL/AGAR DIL: CPT

## 2022-07-01 PROCEDURE — 87086 URINE CULTURE/COLONY COUNT: CPT

## 2022-07-01 PROCEDURE — 87077 CULTURE AEROBIC IDENTIFY: CPT

## 2022-07-01 PROCEDURE — 81001 URINALYSIS AUTO W/SCOPE: CPT

## 2022-07-05 ENCOUNTER — PATIENT MESSAGE (OUTPATIENT)
Dept: FAMILY MEDICINE CLINIC | Facility: CLINIC | Age: 52
End: 2022-07-05

## 2022-07-05 DIAGNOSIS — R23.8 ABNORMAL BRUISING: Primary | ICD-10-CM

## 2022-07-05 NOTE — TELEPHONE ENCOUNTER
From: Mark Subramanian  To: Vanessa Slade  Sent: 6/29/2022 1:57 PM CDT  Subject: CT results    Solange Alonso,   Dr. Steven Ovalles reviewed your CT scan results which shows possible cystitis. Dr. Steven Ovalles would like you to go to the lab today and have a urinalysis and urine culture done, those have been ordered for you to have at an [facility] lab location convenient to you. After you completed the urine testing Dr. Steven Ovalles would like you to start Macrobid 100 mg 2 times a day for 7 days, it is imperative you do not start the medication until you have done the urine testing as it may alter the results. [address] [facility]    [facility]  [facility] [address]  [facility] [id number]    [facility]  [address] [facility] [address] [facility] [address] [facility] [address]  [facility], [address]  [facility] [address]  [address] [facility] [address]    [facility] [place]  [address] [facility] [address] [facility] [address]  [facility] [address]  [facility] [address]    [facility] [address]  [address] [facility] [place]  [address] [facility] [address] [facility] [address] [place]  [address] [facility] [address]  [facility] [address] [facility] [address]  [facility] [address] [facility] [address]  [address] [facility] [address] Rishi Coello RN

## 2022-07-06 LAB — OVA AND PARASITE, FECAL INTERPRETATION: NEGATIVE

## 2022-07-06 RX ORDER — CIPROFLOXACIN 500 MG/1
500 TABLET, FILM COATED ORAL 2 TIMES DAILY
Qty: 14 TABLET | Refills: 0 | Status: SHIPPED | OUTPATIENT
Start: 2022-07-06 | End: 2022-07-13

## 2022-07-06 NOTE — TELEPHONE ENCOUNTER
Patient has bruises that showed up after taking macrobid. See pics. Patient advised to hold macrobid for now- started abx on Monday 7/4/22.    Please advise

## 2022-07-06 NOTE — TELEPHONE ENCOUNTER
From: Arabella STROUD  To: Davon Quezada  Sent: 6/27/2022 11:54 AM CDT  Subject: CT scan    [facility],  Dr. Marcelle Colon decided that she would like you to have the CT scan of your abdomen and pelvis done today so she placed a STAT order. I was able to get you an appointment for 4:30pm at the [place] location (next to our building). She did order oral contrast so you will need to start drinking that 90 minutes prior to your appointment, they ask that you arrive at 3pm. Also you will need to be fasting for the scan, so nothing to eat or drink 2 hours prior. Please let me know if you have any questions. Thanks and have a great day!   Ivette MELENDREZ

## 2022-07-07 LAB — AMB EXT COLOGUARD RESULT: NEGATIVE

## 2022-07-07 NOTE — TELEPHONE ENCOUNTER
Received negative cologuard result, results given to Dr. Jeramy Durbin for her signature, pt notified, and chart updated.

## 2022-07-07 NOTE — TELEPHONE ENCOUNTER
She needs a colonoscopy and a consult with GI regarding her chronic abdominal pain. Please order PT/PTT. Also please explain to her that Mychart is not appropriate use to discuss new symptoms etc.   She needs to properly follow up in the office. A lot can be missed diagnostically over a [employer] message.

## 2022-07-07 NOTE — TELEPHONE ENCOUNTER
Low wbc can indicate a suppressed immune system from a recent illness or active illness, most likely. She needs a colonoscopy to figure out what is causing her abdominal bloating, food intolerance. If she has pain, dehydration, hyperemesis that is worsening she should go to the ER.

## 2022-07-18 ENCOUNTER — PATIENT MESSAGE (OUTPATIENT)
Dept: FAMILY MEDICINE CLINIC | Facility: CLINIC | Age: 52
End: 2022-07-18

## 2022-07-19 NOTE — TELEPHONE ENCOUNTER
From: Merline Negro, DO  To: To Evans  Sent: 6/21/2022 1:53 PM CDT  Subject: colon cancer screen    Hello,     Please let us know if you have completed the Cologuard test for colon cancer screening. If you lost the box or did not receive it please let us know so we can resend it!      Thank you,     Dr. Karen Perez

## 2022-07-20 ENCOUNTER — LAB ENCOUNTER (OUTPATIENT)
Dept: LAB | Age: 52
End: 2022-07-20
Attending: STUDENT IN AN ORGANIZED HEALTH CARE EDUCATION/TRAINING PROGRAM
Payer: COMMERCIAL

## 2022-07-20 DIAGNOSIS — R79.89 ELEVATED LFTS: ICD-10-CM

## 2022-07-20 DIAGNOSIS — R23.3 ABNORMAL BRUISING: ICD-10-CM

## 2022-07-20 DIAGNOSIS — R10.9 ABDOMINAL CRAMPING: ICD-10-CM

## 2022-07-20 DIAGNOSIS — R19.7 DIARRHEA, UNSPECIFIED TYPE: ICD-10-CM

## 2022-07-20 LAB
APTT PPP: 29 SECONDS (ref 23.3–35.6)
CRP SERPL-MCNC: <0.29 MG/DL (ref ?–0.3)
DEPRECATED HBV CORE AB SER IA-ACNC: 199.4 NG/ML
HAV AB SER QL IA: NONREACTIVE
HBV CORE AB SERPL QL IA: NONREACTIVE
HBV SURFACE AB SER QL: NONREACTIVE
HBV SURFACE AB SERPL IA-ACNC: <3.1 MIU/ML
HBV SURFACE AG SER-ACNC: <0.1 [IU]/L
HBV SURFACE AG SERPL QL IA: NONREACTIVE
HCV AB SERPL QL IA: NONREACTIVE
IGA SERPL-MCNC: 196 MG/DL (ref 70–312)
INR BLD: 0.82 (ref 0.8–1.2)
IRON SATN MFR SERPL: 30 %
IRON SERPL-MCNC: 140 UG/DL
PROTHROMBIN TIME: 11.3 SECONDS (ref 11.6–14.8)
TIBC SERPL-MCNC: 463 UG/DL (ref 240–450)
TRANSFERRIN SERPL-MCNC: 311 MG/DL (ref 200–360)

## 2022-07-20 PROCEDURE — 85610 PROTHROMBIN TIME: CPT

## 2022-07-20 PROCEDURE — 82784 ASSAY IGA/IGD/IGG/IGM EACH: CPT

## 2022-07-20 PROCEDURE — 86708 HEPATITIS A ANTIBODY: CPT

## 2022-07-20 PROCEDURE — 86364 TISS TRNSGLTMNASE EA IG CLAS: CPT

## 2022-07-20 PROCEDURE — 86706 HEP B SURFACE ANTIBODY: CPT

## 2022-07-20 PROCEDURE — 83540 ASSAY OF IRON: CPT

## 2022-07-20 PROCEDURE — 82728 ASSAY OF FERRITIN: CPT

## 2022-07-20 PROCEDURE — 86803 HEPATITIS C AB TEST: CPT

## 2022-07-20 PROCEDURE — 86140 C-REACTIVE PROTEIN: CPT

## 2022-07-20 PROCEDURE — 85730 THROMBOPLASTIN TIME PARTIAL: CPT

## 2022-07-20 PROCEDURE — 86256 FLUORESCENT ANTIBODY TITER: CPT

## 2022-07-20 PROCEDURE — 86704 HEP B CORE ANTIBODY TOTAL: CPT

## 2022-07-20 PROCEDURE — 36415 COLL VENOUS BLD VENIPUNCTURE: CPT

## 2022-07-20 PROCEDURE — 86038 ANTINUCLEAR ANTIBODIES: CPT

## 2022-07-20 PROCEDURE — 87340 HEPATITIS B SURFACE AG IA: CPT

## 2022-07-20 PROCEDURE — 83550 IRON BINDING TEST: CPT

## 2022-07-22 LAB
SMOOTH MUSCLE AB TITR SER: <20 {TITER}
TTG IGA SER-ACNC: 0.5 U/ML (ref ?–7)

## 2022-07-23 NOTE — PROGRESS NOTES
[facility] are normal, no worrisome findings. No change to the plan.   Please call with any questions,    Mary Zayas MD

## 2022-07-25 LAB — ANA SER QL: NEGATIVE

## 2022-07-25 NOTE — PROGRESS NOTES
Tasha Mireles,    Since work-up has come back completely unrevealing for causes of liver injury, I would like to get an US abdomen to make sure we're not missing something. Further, I will have my staff look into possibility of having you come in sooner for EGD and colonoscopy to get to the bottom of your symptoms.   Please call with any questions,    Ingris Mendez MD

## 2022-07-25 NOTE — PROGRESS NOTES
Mathew Mai,    Pt needs EGD and colonoscopy, sooner than 9/2022. Please find something earlier, symptomatic evaluation.     PM

## 2022-07-27 DIAGNOSIS — R79.1 ABNORMAL PROTHROMBIN TIME (PT): Primary | ICD-10-CM

## 2022-09-17 ENCOUNTER — LAB ENCOUNTER (OUTPATIENT)
Dept: LAB | Age: 52
End: 2022-09-17
Attending: STUDENT IN AN ORGANIZED HEALTH CARE EDUCATION/TRAINING PROGRAM

## 2022-09-17 DIAGNOSIS — Z01.818 PREOP TESTING: ICD-10-CM

## 2022-09-18 LAB — SARS-COV-2 RNA RESP QL NAA+PROBE: NOT DETECTED

## 2022-09-20 PROBLEM — R14.1 ABDOMINAL GAS PAIN: Status: ACTIVE | Noted: 2022-09-20

## 2022-09-20 PROBLEM — K63.5 COLON POLYPS: Status: ACTIVE | Noted: 2022-09-20

## 2022-09-20 PROBLEM — R19.7 DIARRHEA, UNSPECIFIED: Status: ACTIVE | Noted: 2022-09-20

## 2022-09-20 PROBLEM — K29.60 OTHER GASTRITIS WITHOUT BLEEDING: Status: ACTIVE | Noted: 2022-09-20

## 2022-09-24 DIAGNOSIS — F17.210 CIGARETTE SMOKER: ICD-10-CM

## 2022-09-24 DIAGNOSIS — Z72.0 TOBACCO USE: ICD-10-CM

## 2022-09-27 ENCOUNTER — OFFICE VISIT (OUTPATIENT)
Dept: FAMILY MEDICINE CLINIC | Facility: CLINIC | Age: 52
End: 2022-09-27

## 2022-09-27 VITALS
TEMPERATURE: 98 F | DIASTOLIC BLOOD PRESSURE: 82 MMHG | HEART RATE: 74 BPM | OXYGEN SATURATION: 99 % | HEIGHT: 66 IN | RESPIRATION RATE: 16 BRPM | WEIGHT: 157 LBS | BODY MASS INDEX: 25.23 KG/M2 | SYSTOLIC BLOOD PRESSURE: 138 MMHG

## 2022-09-27 DIAGNOSIS — Z23 NEED FOR INFLUENZA VACCINATION: ICD-10-CM

## 2022-09-27 DIAGNOSIS — Z12.31 ENCOUNTER FOR SCREENING MAMMOGRAM FOR MALIGNANT NEOPLASM OF BREAST: ICD-10-CM

## 2022-09-27 DIAGNOSIS — I10 ESSENTIAL HYPERTENSION: ICD-10-CM

## 2022-09-27 DIAGNOSIS — R74.8 ELEVATED LIVER ENZYMES: ICD-10-CM

## 2022-09-27 DIAGNOSIS — Z00.00 WELL ADULT EXAM: Primary | ICD-10-CM

## 2022-09-27 DIAGNOSIS — M47.812 OSTEOARTHRITIS OF CERVICAL SPINE, UNSPECIFIED SPINAL OSTEOARTHRITIS COMPLICATION STATUS: ICD-10-CM

## 2022-09-27 PROBLEM — R19.7 DIARRHEA, UNSPECIFIED: Status: RESOLVED | Noted: 2022-09-20 | Resolved: 2022-09-27

## 2022-09-27 PROBLEM — F17.210 CIGARETTE SMOKER: Status: RESOLVED | Noted: 2021-09-16 | Resolved: 2022-09-27

## 2022-09-27 PROBLEM — F43.9 STRESS: Status: RESOLVED | Noted: 2021-09-16 | Resolved: 2022-09-27

## 2022-09-27 PROBLEM — R14.1 ABDOMINAL GAS PAIN: Status: RESOLVED | Noted: 2022-09-20 | Resolved: 2022-09-27

## 2022-09-27 PROCEDURE — 3008F BODY MASS INDEX DOCD: CPT | Performed by: FAMILY MEDICINE

## 2022-09-27 PROCEDURE — 90471 IMMUNIZATION ADMIN: CPT | Performed by: FAMILY MEDICINE

## 2022-09-27 PROCEDURE — 99396 PREV VISIT EST AGE 40-64: CPT | Performed by: FAMILY MEDICINE

## 2022-09-27 PROCEDURE — 3075F SYST BP GE 130 - 139MM HG: CPT | Performed by: FAMILY MEDICINE

## 2022-09-27 PROCEDURE — 3079F DIAST BP 80-89 MM HG: CPT | Performed by: FAMILY MEDICINE

## 2022-09-27 PROCEDURE — 90686 IIV4 VACC NO PRSV 0.5 ML IM: CPT | Performed by: FAMILY MEDICINE

## 2022-10-04 RX ORDER — BUPROPION HYDROCHLORIDE 150 MG/1
TABLET, EXTENDED RELEASE ORAL
Qty: 180 TABLET | Refills: 1 | OUTPATIENT
Start: 2022-10-04

## 2022-11-21 ENCOUNTER — HOSPITAL ENCOUNTER (OUTPATIENT)
Dept: MAMMOGRAPHY | Age: 52
Discharge: HOME | End: 2022-11-21
Attending: FAMILY MEDICINE
Payer: COMMERCIAL

## 2022-11-21 DIAGNOSIS — Z12.31 VISIT FOR SCREENING MAMMOGRAM: ICD-10-CM

## 2022-11-21 PROCEDURE — 77067 SCR MAMMO BI INCL CAD: CPT | Performed by: FAMILY MEDICINE

## 2022-11-21 PROCEDURE — 77063 BREAST TOMOSYNTHESIS BI: CPT | Performed by: FAMILY MEDICINE

## (undated) DIAGNOSIS — Z72.0 TOBACCO USE: ICD-10-CM

## (undated) DIAGNOSIS — F17.210 CIGARETTE SMOKER: ICD-10-CM

## (undated) DEVICE — ESSENTIAL SHOULDER SLING M

## (undated) DEVICE — CANNULA 7MM TWIST AR-6570

## (undated) DEVICE — ABDOMINAL PAD: Brand: DERMACEA

## (undated) DEVICE — [AGGRESSIVE PLUS CUTTER, ARTHROSCOPIC SHAVER BLADE,  DO NOT RESTERILIZE,  DO NOT USE IF PACKAGE IS DAMAGED,  KEEP DRY,  KEEP AWAY FROM SUNLIGHT]: Brand: FORMULA

## (undated) DEVICE — Device

## (undated) DEVICE — VAPR COOLPULSE 90 ELECTRODE WITH HAND CONTROLS 90 DEGREES SUCTION WITH INTEGRATED HANDPIECE: Brand: VAPR COOLPULSE

## (undated) DEVICE — SUTURE PASSER AR-4068-90

## (undated) DEVICE — SUTURE FIBERWIRE S AR-7200

## (undated) DEVICE — 3M™ COBAN™ NL STERILE NON-LATEX SELF-ADHERENT WRAP, 2084S, 4 IN X 5 YD (10 CM X 4,5 M), 18 ROLLS/CASE: Brand: 3M™ COBAN™

## (undated) DEVICE — SUTURE TIGERLOOP #2

## (undated) DEVICE — SUTURE TAPE

## (undated) DEVICE — SUTURE ETHILON 3-0 PS-1

## (undated) DEVICE — SHOULDER ARTHROSCOPY CDS-LF: Brand: MEDLINE INDUSTRIES, INC.

## (undated) DEVICE — 10K/24K ARTHROSCOPY INFLOW TUBE SET: Brand: 10K/24K

## (undated) DEVICE — SOL  .9 3000ML

## (undated) DEVICE — DRAPE,U/SHT,SPLIT,FILM,60X84,STERILE: Brand: MEDLINE

## (undated) DEVICE — SHEET,DRAPE,70X100,STERILE: Brand: MEDLINE

## (undated) DEVICE — WRAP COOLING SHLDR W/ICE PILLO

## (undated) DEVICE — NON-ADHERENT STRIPS,OIL EMULSION: Brand: CURITY

## (undated) DEVICE — CONVERTORS STOCKINETTE: Brand: CONVERTORS

## (undated) DEVICE — STERILE POLYISOPRENE POWDER-FREE SURGICAL GLOVES: Brand: PROTEXIS

## (undated) DEVICE — KENDALL SCD EXPRESS SLEEVES, KNEE LENGTH, MEDIUM: Brand: KENDALL SCD

## (undated) DEVICE — FIBERWIRE 2.0 AR-7220

## (undated) DEVICE — SPK10022 SCHLEIN POSITIONING KIT: Brand: SPK10022 SCHLEIN POSITIONING KIT

## (undated) NOTE — LETTER
10/11/17        [place] [id number]      Dear Eulalio Harris,    [address] [facility] records indicate that you have outstanding lab work and or testing that was ordered for you and has not yet been completed:          Comp Metabolic Panel